# Patient Record
Sex: MALE | Employment: FULL TIME | URBAN - METROPOLITAN AREA
[De-identification: names, ages, dates, MRNs, and addresses within clinical notes are randomized per-mention and may not be internally consistent; named-entity substitution may affect disease eponyms.]

---

## 2019-01-04 ENCOUNTER — HOSPITAL ENCOUNTER (INPATIENT)
Facility: CLINIC | Age: 64
LOS: 7 days | Discharge: HOME HEALTH | DRG: 603 | End: 2019-01-11
Attending: EMERGENCY MEDICINE | Admitting: INTERNAL MEDICINE

## 2019-01-04 DIAGNOSIS — L02.419 ABSCESS OF ANKLE: ICD-10-CM

## 2019-01-04 DIAGNOSIS — L03.119 ANKLE CELLULITIS: ICD-10-CM

## 2019-01-04 DIAGNOSIS — L03.116 CELLULITIS AND ABSCESS OF LEFT LEG: Primary | ICD-10-CM

## 2019-01-04 DIAGNOSIS — L02.416 CELLULITIS AND ABSCESS OF LEFT LEG: Primary | ICD-10-CM

## 2019-01-04 DIAGNOSIS — A41.9 SEPSIS, DUE TO UNSPECIFIED ORGANISM: ICD-10-CM

## 2019-01-04 PROBLEM — E88.819 INSULIN RESISTANCE: Status: ACTIVE | Noted: 2019-01-04

## 2019-01-04 LAB
ANION GAP SERPL CALCULATED.3IONS-SCNC: 6 MMOL/L (ref 3–14)
BASOPHILS # BLD AUTO: 0 10E9/L (ref 0–0.2)
BASOPHILS NFR BLD AUTO: 0.3 %
BUN SERPL-MCNC: 16 MG/DL (ref 7–30)
CALCIUM SERPL-MCNC: 9.2 MG/DL (ref 8.5–10.1)
CHLORIDE SERPL-SCNC: 105 MMOL/L (ref 94–109)
CO2 SERPL-SCNC: 26 MMOL/L (ref 20–32)
CREAT SERPL-MCNC: 0.87 MG/DL (ref 0.66–1.25)
DIFFERENTIAL METHOD BLD: ABNORMAL
EOSINOPHIL # BLD AUTO: 0 10E9/L (ref 0–0.7)
EOSINOPHIL NFR BLD AUTO: 0.1 %
ERYTHROCYTE [DISTWIDTH] IN BLOOD BY AUTOMATED COUNT: 13 % (ref 10–15)
GFR SERPL CREATININE-BSD FRML MDRD: >90 ML/MIN/{1.73_M2}
GLUCOSE SERPL-MCNC: 102 MG/DL (ref 70–99)
HBA1C MFR BLD: 5.9 % (ref 0–5.6)
HCT VFR BLD AUTO: 41.8 % (ref 40–53)
HGB BLD-MCNC: 14.9 G/DL (ref 13.3–17.7)
IMM GRANULOCYTES # BLD: 0.1 10E9/L (ref 0–0.4)
IMM GRANULOCYTES NFR BLD: 0.7 %
LACTATE SERPL-SCNC: 1.7 MMOL/L (ref 0.4–2)
LYMPHOCYTES # BLD AUTO: 1.7 10E9/L (ref 0.8–5.3)
LYMPHOCYTES NFR BLD AUTO: 11.3 %
MCH RBC QN AUTO: 32 PG (ref 26.5–33)
MCHC RBC AUTO-ENTMCNC: 35.6 G/DL (ref 31.5–36.5)
MCV RBC AUTO: 90 FL (ref 78–100)
MONOCYTES # BLD AUTO: 1.3 10E9/L (ref 0–1.3)
MONOCYTES NFR BLD AUTO: 8.5 %
NEUTROPHILS # BLD AUTO: 11.9 10E9/L (ref 1.6–8.3)
NEUTROPHILS NFR BLD AUTO: 79.1 %
NRBC # BLD AUTO: 0 10*3/UL
NRBC BLD AUTO-RTO: 0 /100
PLATELET # BLD AUTO: 309 10E9/L (ref 150–450)
POTASSIUM SERPL-SCNC: 3.8 MMOL/L (ref 3.4–5.3)
RBC # BLD AUTO: 4.65 10E12/L (ref 4.4–5.9)
SODIUM SERPL-SCNC: 137 MMOL/L (ref 133–144)
WBC # BLD AUTO: 15 10E9/L (ref 4–11)

## 2019-01-04 PROCEDURE — 83605 ASSAY OF LACTIC ACID: CPT | Performed by: EMERGENCY MEDICINE

## 2019-01-04 PROCEDURE — 10060 I&D ABSCESS SIMPLE/SINGLE: CPT

## 2019-01-04 PROCEDURE — 25000128 H RX IP 250 OP 636: Performed by: EMERGENCY MEDICINE

## 2019-01-04 PROCEDURE — 36415 COLL VENOUS BLD VENIPUNCTURE: CPT | Performed by: EMERGENCY MEDICINE

## 2019-01-04 PROCEDURE — 87186 SC STD MICRODIL/AGAR DIL: CPT | Performed by: EMERGENCY MEDICINE

## 2019-01-04 PROCEDURE — 87077 CULTURE AEROBIC IDENTIFY: CPT | Performed by: EMERGENCY MEDICINE

## 2019-01-04 PROCEDURE — 96365 THER/PROPH/DIAG IV INF INIT: CPT

## 2019-01-04 PROCEDURE — 87040 BLOOD CULTURE FOR BACTERIA: CPT | Performed by: EMERGENCY MEDICINE

## 2019-01-04 PROCEDURE — 12000000 ZZH R&B MED SURG/OB

## 2019-01-04 PROCEDURE — 25000132 ZZH RX MED GY IP 250 OP 250 PS 637: Performed by: EMERGENCY MEDICINE

## 2019-01-04 PROCEDURE — 99223 1ST HOSP IP/OBS HIGH 75: CPT | Mod: AI | Performed by: INTERNAL MEDICINE

## 2019-01-04 PROCEDURE — 83036 HEMOGLOBIN GLYCOSYLATED A1C: CPT | Performed by: EMERGENCY MEDICINE

## 2019-01-04 PROCEDURE — 0H9NXZZ DRAINAGE OF LEFT FOOT SKIN, EXTERNAL APPROACH: ICD-10-PCS | Performed by: EMERGENCY MEDICINE

## 2019-01-04 PROCEDURE — 87800 DETECT AGNT MULT DNA DIREC: CPT | Performed by: EMERGENCY MEDICINE

## 2019-01-04 PROCEDURE — 96366 THER/PROPH/DIAG IV INF ADDON: CPT

## 2019-01-04 PROCEDURE — 25000128 H RX IP 250 OP 636: Performed by: INTERNAL MEDICINE

## 2019-01-04 PROCEDURE — 96367 TX/PROPH/DG ADDL SEQ IV INF: CPT

## 2019-01-04 PROCEDURE — 87070 CULTURE OTHR SPECIMN AEROBIC: CPT | Performed by: EMERGENCY MEDICINE

## 2019-01-04 PROCEDURE — 25000132 ZZH RX MED GY IP 250 OP 250 PS 637: Performed by: INTERNAL MEDICINE

## 2019-01-04 PROCEDURE — 99207 ZZC CDG-MDM COMPONENT: MEETS MODERATE - UP CODED: CPT | Performed by: INTERNAL MEDICINE

## 2019-01-04 PROCEDURE — 76882 US LMTD JT/FCL EVL NVASC XTR: CPT

## 2019-01-04 PROCEDURE — 80048 BASIC METABOLIC PNL TOTAL CA: CPT | Performed by: EMERGENCY MEDICINE

## 2019-01-04 PROCEDURE — 96361 HYDRATE IV INFUSION ADD-ON: CPT

## 2019-01-04 PROCEDURE — 99285 EMERGENCY DEPT VISIT HI MDM: CPT | Mod: 25

## 2019-01-04 PROCEDURE — 85025 COMPLETE CBC W/AUTO DIFF WBC: CPT | Performed by: EMERGENCY MEDICINE

## 2019-01-04 RX ORDER — PIPERACILLIN SODIUM, TAZOBACTAM SODIUM 3; .375 G/15ML; G/15ML
3.38 INJECTION, POWDER, LYOPHILIZED, FOR SOLUTION INTRAVENOUS EVERY 6 HOURS
Status: DISCONTINUED | OUTPATIENT
Start: 2019-01-05 | End: 2019-01-05

## 2019-01-04 RX ORDER — HYDROCODONE BITARTRATE AND ACETAMINOPHEN 5; 325 MG/1; MG/1
1-2 TABLET ORAL EVERY 4 HOURS PRN
Status: DISCONTINUED | OUTPATIENT
Start: 2019-01-04 | End: 2019-01-11 | Stop reason: HOSPADM

## 2019-01-04 RX ORDER — IBUPROFEN 200 MG
200 TABLET ORAL EVERY 6 HOURS PRN
COMMUNITY

## 2019-01-04 RX ORDER — IBUPROFEN 600 MG/1
600 TABLET, FILM COATED ORAL 3 TIMES DAILY PRN
Status: DISCONTINUED | OUTPATIENT
Start: 2019-01-04 | End: 2019-01-11 | Stop reason: HOSPADM

## 2019-01-04 RX ORDER — ACETAMINOPHEN 500 MG
1000 TABLET ORAL ONCE
Status: COMPLETED | OUTPATIENT
Start: 2019-01-04 | End: 2019-01-04

## 2019-01-04 RX ORDER — DEXTROSE MONOHYDRATE 25 G/50ML
25-50 INJECTION, SOLUTION INTRAVENOUS
Status: DISCONTINUED | OUTPATIENT
Start: 2019-01-04 | End: 2019-01-06

## 2019-01-04 RX ORDER — HYDROMORPHONE HYDROCHLORIDE 1 MG/ML
0.5 INJECTION, SOLUTION INTRAMUSCULAR; INTRAVENOUS; SUBCUTANEOUS
Status: DISCONTINUED | OUTPATIENT
Start: 2019-01-04 | End: 2019-01-04

## 2019-01-04 RX ORDER — DOCUSATE SODIUM 100 MG/1
100 CAPSULE, LIQUID FILLED ORAL 2 TIMES DAILY
Status: DISCONTINUED | OUTPATIENT
Start: 2019-01-04 | End: 2019-01-11 | Stop reason: HOSPADM

## 2019-01-04 RX ORDER — ONDANSETRON 2 MG/ML
4 INJECTION INTRAMUSCULAR; INTRAVENOUS EVERY 6 HOURS PRN
Status: DISCONTINUED | OUTPATIENT
Start: 2019-01-04 | End: 2019-01-11 | Stop reason: HOSPADM

## 2019-01-04 RX ORDER — ACETAMINOPHEN 325 MG/1
650 TABLET ORAL EVERY 4 HOURS PRN
Status: DISCONTINUED | OUTPATIENT
Start: 2019-01-04 | End: 2019-01-11 | Stop reason: HOSPADM

## 2019-01-04 RX ORDER — NICOTINE POLACRILEX 4 MG
15-30 LOZENGE BUCCAL
Status: DISCONTINUED | OUTPATIENT
Start: 2019-01-04 | End: 2019-01-06

## 2019-01-04 RX ORDER — SODIUM CHLORIDE 9 MG/ML
INJECTION, SOLUTION INTRAVENOUS CONTINUOUS
Status: DISCONTINUED | OUTPATIENT
Start: 2019-01-04 | End: 2019-01-06

## 2019-01-04 RX ORDER — ONDANSETRON 4 MG/1
4 TABLET, ORALLY DISINTEGRATING ORAL EVERY 6 HOURS PRN
Status: DISCONTINUED | OUTPATIENT
Start: 2019-01-04 | End: 2019-01-11 | Stop reason: HOSPADM

## 2019-01-04 RX ORDER — NALOXONE HYDROCHLORIDE 0.4 MG/ML
.1-.4 INJECTION, SOLUTION INTRAMUSCULAR; INTRAVENOUS; SUBCUTANEOUS
Status: DISCONTINUED | OUTPATIENT
Start: 2019-01-04 | End: 2019-01-11 | Stop reason: HOSPADM

## 2019-01-04 RX ORDER — PIPERACILLIN SODIUM, TAZOBACTAM SODIUM 3; .375 G/15ML; G/15ML
3.38 INJECTION, POWDER, LYOPHILIZED, FOR SOLUTION INTRAVENOUS ONCE
Status: COMPLETED | OUTPATIENT
Start: 2019-01-04 | End: 2019-01-04

## 2019-01-04 RX ADMIN — Medication 0.5 MG: at 17:43

## 2019-01-04 RX ADMIN — SODIUM CHLORIDE 1000 ML: 9 INJECTION, SOLUTION INTRAVENOUS at 17:45

## 2019-01-04 RX ADMIN — SODIUM CHLORIDE: 9 INJECTION, SOLUTION INTRAVENOUS at 22:31

## 2019-01-04 RX ADMIN — PIPERACILLIN SODIUM,TAZOBACTAM SODIUM 3.38 G: 3; .375 INJECTION, POWDER, FOR SOLUTION INTRAVENOUS at 18:29

## 2019-01-04 RX ADMIN — SODIUM CHLORIDE 1500 MG: 9 INJECTION, SOLUTION INTRAVENOUS at 20:12

## 2019-01-04 RX ADMIN — HYDROCODONE BITARTRATE AND ACETAMINOPHEN 1 TABLET: 5; 325 TABLET ORAL at 22:30

## 2019-01-04 RX ADMIN — ACETAMINOPHEN 1000 MG: 500 TABLET, FILM COATED ORAL at 17:43

## 2019-01-04 SDOH — HEALTH STABILITY: MENTAL HEALTH: HOW OFTEN DO YOU HAVE A DRINK CONTAINING ALCOHOL?: NEVER

## 2019-01-04 ASSESSMENT — ACTIVITIES OF DAILY LIVING (ADL)
FALL_HISTORY_WITHIN_LAST_SIX_MONTHS: NO
TOILETING: 0-->INDEPENDENT
DRESS: 0-->INDEPENDENT
SWALLOWING: 0-->SWALLOWS FOODS/LIQUIDS WITHOUT DIFFICULTY
AMBULATION: 0-->INDEPENDENT
TRANSFERRING: 0-->INDEPENDENT
RETIRED_COMMUNICATION: 0-->UNDERSTANDS/COMMUNICATES WITHOUT DIFFICULTY
COGNITION: 0 - NO COGNITION ISSUES REPORTED
RETIRED_EATING: 0-->INDEPENDENT
BATHING: 0-->INDEPENDENT

## 2019-01-04 ASSESSMENT — MIFFLIN-ST. JEOR: SCORE: 1502.99

## 2019-01-04 ASSESSMENT — ENCOUNTER SYMPTOMS
CHILLS: 1
FEVER: 0

## 2019-01-04 NOTE — ED PROVIDER NOTES
"  History     Chief Complaint:  Left Ankle Redness, Pain, Swelling    HPI   Swapnil Newberry is an otherwise healthy 63 year old Tajik-speaking male who presents for evaluation of pain, redness, and swelling to his medial left ankle. The patient reports he first noticed a small rash to the area on Saturday, 6 days ago. Since that time, he has had spreading redness, increased swelling, and pain to his medial ankle. He has had difficulty walking today secondary to pain. He is taking Ibuprofen at home without significant relief, last dose this morning. The patient reports chills and feeling feverish at home but denies any measured fever, nausea, vomiting, or other systemic symptoms. He denies numbness or weakness in his toes. He denies any trauma or injury. He denies being diabetic and not immunocompromised. He is not anticoagulated.     Allergies:  No Known Allergies     Medications:    The patient is not currently taking any prescribed medications.    Past Medical History:    History reviewed. No pertinent past medical history.    Past Surgical History:    Hernia repair    Family History:    History reviewed. No pertinent family history.     Social History:  Smoking status: Former smoker  Alcohol use: Occasional   The patient works in construction.  Presents to the ED with his wife and goddaughter.     Review of Systems   Constitutional: Positive for chills. Negative for fever.   Skin: Positive for rash.   All other systems reviewed and are negative.      Physical Exam     Patient Vitals for the past 24 hrs:   BP Temp Temp src Heart Rate Resp SpO2 Height Weight   01/04/19 1531 129/77 99.9  F (37.7  C) Oral 120 20 100 % 1.651 m (5' 5\") 77.1 kg (170 lb)       Physical Exam  General: male semi-recumbent in room 30, family at bedside  HENT: MMM, no signs of facial trauma  CV: tachycardic, regular rhythm, soft compartments throughout LLE, cap refill normal, normal L DP and PT pulses  Resp: normal effort, " clear throughout  GI: abdomen soft, nontender  MSK:  Extremities: significant tenderness and moderate swelling to L medial ankle overlying medial malleolus with slight fluctuance, no swelling to remainder of LLE  No pain with axial loading of L ankle or evidence of ankle effusion  Can dorsiflex and plantar flex L ankle, and has full movement of all L toes  L knee normal  Skin:   Erythema primarily overlying L medial ankle where swollen, but also extending slightly up postero-medial L ankle/lower leg but no streaking  Neuro: awake, alert, GCS 15, responds appropriately to commands, SILT all extremities  Psych: cooperative, pleasant      Emergency Department Course   Laboratory:  CBC: WBC 15.0(H), o/w WNL (HGB 14.9, )  BMP: Glucose 102(H), o/w WNL (Creatinine 0.87)  Lactic acid: 1.7  Blood cultures x2: in process  Wound culture: in process      Procedure: Incision and Drainage   Performed by: Travis Buckner MD    LOCATION:  L medial ankle      ANESTHESIA:  Local field block using Marcaine 0.5% without epinephrine, total of 3 mLs    PREPARATION:  Cleansed with Betadine    PROCEDURE:  Area was incised with # 11 Blade ([place]) with a cruciate incision.  Wound treatment included Deloculation, Purulent Drainage and Expression of Material.  Packing consisted of No Packing.  Appropriate dressing was applied to cover the area.    Patient Status:  Patient tolerated the procedure moderately well. There were no complications evident      ED Bedside Limited Ultrasound  Body area scanned: L medial ankle  Performed by: Travis Buckner MD  Indication: pain/redness/swelling, eval for abscess  Findings/Interpretation: fluid collection identified primarily overlying L medial malleolus, no deeper extension evident on US, no nearby pulsatile vessels  Consistent with soft tissue abscess   Key images were digitally archived in the [employer] [facility] radiology system.    Interventions:  1743: Dilaudid 0.5 mg IV  1743: Tylenol 1,000 mg  "oral  1745: NS 1L IV Bolus  Zosyn 3.375 g IV ordered  Vancomycin 1,500 mg IV ordered    Emergency Department Course:  Past medical records, nursing notes, and vitals reviewed.  1711: I performed an exam of the patient and obtained history, as documented above.    1718: POC Bedside US performed per the above procedure note. US reveals abscess which was drained per the above procedure note.     IV inserted and blood drawn.    I performed electronic chart review in EPIC.  I reviewed available electronic records in Care Everywhere.    1817: I spoke to Dr. Pizano of the hospitalist service who accepts the patient for admission.     Findings and plan explained to the Patient and family who consents to admission.   Discussed the patient with Dr. Pizano, who will admit the patient to a inpatient med bed for further monitoring, evaluation, and treatment.     Impression & Plan      Medical Decision Making:  Patient presents with elevated temperature, tachycardia, and obvious evidence of a soft tissue infection on exam. Ultrasound confirmed a fairly superficial abscess which I drained as documented above. I considered involvement of the actual ankle joint itself, though he does not have palpable ankle effusion or pain with axial loading and I do not think this represents a septic joint nor that orthopedics needs to be involved emergently. However, given his systemic symptoms, I think initiation of broad spectrum antibiotics and hospitalization for close monitoring is indicated.  I do not suspect a necrotizing infection.  He denies being diabetic though [facility] records show \"insulin resistance\" that has so far not required formal treatment.  He and his family agree with this plan and he will be admitted for further care.     Diagnosis:    ICD-10-CM   1. Abscess of ankle L02.419   2. Ankle cellulitis L03.119   3. Sepsis, due to unspecified organism (H) A41.9       Disposition: Admitted under the care of Dr. Jerica Stone " Roberta  1/4/2019    EMERGENCY DEPARTMENT    I, Bertha Roberta, am serving as a scribe at 5:11 PM on 1/4/2019 to document services personally performed by Travis Buckner MD based on my observations and the provider's statements to me.     This record was created at least in part using electronic voice recognition software, so please excuse any typographical errors.         Travis Buckner MD  01/04/19 9347

## 2019-01-05 LAB
ERYTHROCYTE [DISTWIDTH] IN BLOOD BY AUTOMATED COUNT: 13.4 % (ref 10–15)
GLUCOSE BLDC GLUCOMTR-MCNC: 108 MG/DL (ref 70–99)
GLUCOSE BLDC GLUCOMTR-MCNC: 131 MG/DL (ref 70–99)
GLUCOSE BLDC GLUCOMTR-MCNC: 134 MG/DL (ref 70–99)
GLUCOSE BLDC GLUCOMTR-MCNC: 91 MG/DL (ref 70–99)
HCT VFR BLD AUTO: 39.5 % (ref 40–53)
HGB BLD-MCNC: 13.6 G/DL (ref 13.3–17.7)
MCH RBC QN AUTO: 31.1 PG (ref 26.5–33)
MCHC RBC AUTO-ENTMCNC: 34.4 G/DL (ref 31.5–36.5)
MCV RBC AUTO: 90 FL (ref 78–100)
PLATELET # BLD AUTO: 310 10E9/L (ref 150–450)
RBC # BLD AUTO: 4.37 10E12/L (ref 4.4–5.9)
WBC # BLD AUTO: 14.8 10E9/L (ref 4–11)

## 2019-01-05 PROCEDURE — 00000146 ZZHCL STATISTIC GLUCOSE BY METER IP

## 2019-01-05 PROCEDURE — 99232 SBSQ HOSP IP/OBS MODERATE 35: CPT | Performed by: INTERNAL MEDICINE

## 2019-01-05 PROCEDURE — 25000128 H RX IP 250 OP 636: Performed by: INTERNAL MEDICINE

## 2019-01-05 PROCEDURE — 36415 COLL VENOUS BLD VENIPUNCTURE: CPT | Performed by: INTERNAL MEDICINE

## 2019-01-05 PROCEDURE — 12000000 ZZH R&B MED SURG/OB

## 2019-01-05 PROCEDURE — 25000132 ZZH RX MED GY IP 250 OP 250 PS 637: Performed by: INTERNAL MEDICINE

## 2019-01-05 PROCEDURE — 85027 COMPLETE CBC AUTOMATED: CPT | Performed by: INTERNAL MEDICINE

## 2019-01-05 RX ADMIN — HYDROCODONE BITARTRATE AND ACETAMINOPHEN 2 TABLET: 5; 325 TABLET ORAL at 06:47

## 2019-01-05 RX ADMIN — PIPERACILLIN SODIUM,TAZOBACTAM SODIUM 3.38 G: 3; .375 INJECTION, POWDER, FOR SOLUTION INTRAVENOUS at 01:13

## 2019-01-05 RX ADMIN — HYDROCODONE BITARTRATE AND ACETAMINOPHEN 2 TABLET: 5; 325 TABLET ORAL at 16:49

## 2019-01-05 RX ADMIN — DOCUSATE SODIUM 100 MG: 100 CAPSULE, LIQUID FILLED ORAL at 09:12

## 2019-01-05 RX ADMIN — PIPERACILLIN SODIUM,TAZOBACTAM SODIUM 3.38 G: 3; .375 INJECTION, POWDER, FOR SOLUTION INTRAVENOUS at 07:42

## 2019-01-05 RX ADMIN — HYDROCODONE BITARTRATE AND ACETAMINOPHEN 2 TABLET: 5; 325 TABLET ORAL at 10:42

## 2019-01-05 RX ADMIN — SODIUM CHLORIDE: 9 INJECTION, SOLUTION INTRAVENOUS at 13:39

## 2019-01-05 RX ADMIN — HYDROCODONE BITARTRATE AND ACETAMINOPHEN 2 TABLET: 5; 325 TABLET ORAL at 22:44

## 2019-01-05 RX ADMIN — SODIUM CHLORIDE 1500 MG: 9 INJECTION, SOLUTION INTRAVENOUS at 15:01

## 2019-01-05 ASSESSMENT — ACTIVITIES OF DAILY LIVING (ADL)
ADLS_ACUITY_SCORE: 12

## 2019-01-05 NOTE — PROVIDER NOTIFICATION
"MD Notification    Notified Person: MD    Notified Person Name: Turner    Notification Date/Time: 01/05/19  9:18 AM    Notification Interaction: online paging    Purpose of Notification: \"Pt had positive blood cultures from 1/4 draw growing gram positive cocci clusters\"    Orders Received:     Comments:  "

## 2019-01-05 NOTE — PLAN OF CARE
Pt arrived from ED at 2045. Pt alert and oriented. Sami speaker. C/O tingling on LLE. Pt vital signs stable. Afebrile. LLE with  swelling and redness. Kerlix around the wound. Dressing with dried drainage. IVF infusing. Pt on antibiotics. LLE elevated. Voiding adequate amounts per urinal. Wife at bedside. Will continue to monitor

## 2019-01-05 NOTE — PROGRESS NOTES
"[facility]    Hospitalist Progress Note    Date of Service (when I saw the patient): 01/05/2019    Assessment & Plan   64 y/o male with a h/o \"insulin resistance\" presented to the ED on 1/4 with LLE erythema and pain. Admitted for treatment of cellulitis    Cellulitis  Abscess (drained in ED)  As above, presented with L ankle and foot pain. Ultrasound in ED with superficial abscess which was drained. Tmax since admission at 99.9. WBC initially 15.0-> WBC to 14.8  - blood cultures x 2 positive for GPC in clusters, MRSA  - on zosyn on admission, dose of vanco given-> with MRSA discontinued zosyn and continue on IV vancomycin  - given MRSA in blood, will consult ID on 1/6 for guidance with duration of abx  - monitor, if slow to improve will consult podiatry  - remains afebrile  - trend WBC  - prn norco, prn acetaminophen    \"insulin resistance\"  Noted on outpatient records. A1C at 5.9  - on sliding scale insulin, no insulin needed  - likely discontinue on 1/6 if remains under control.    # Pain Assessment:  Current Pain Score 1/5/2019   Patient currently in pain? denies   Pain score (0-10) -   Swapnil del valle pain level was assessed, 2/2 celluitis. As above    FEN (fluids, electrolytes and nutrition): regular diet  Discussed with nursing.  DVT Prophylaxis: Enoxaparin (Lovenox) SQ  Code Status: Full Code    Disposition: Expected discharge in 2+ days pending response to abx, ID plans    Natanael Tompkins MD  [phone] (P)  Text Page    Interval History   Doing ok. Not able to get  (left prior to my visit, unable to get video ). Denies cp/sob. Eating ok.     -Data reviewed today: I reviewed all new labs and imaging results over the last 24 hours. I personally reviewed no images or EKG's today.    Physical Exam   Temp: 98.3  F (36.8  C) Temp src: Oral BP: 117/66 Pulse: 90 Heart Rate: 90 Resp: 16 SpO2: 95 % O2 Device: None (Room air)    Vitals:    01/04/19 1531   Weight: 77.1 kg (170 lb) "     Vital Signs with Ranges  Temp:  [98.3  F (36.8  C)-99  F (37.2  C)] 98.3  F (36.8  C)  Pulse:  [] 90  Heart Rate:  [90-98] 90  Resp:  [16-20] 16  BP: (116-147)/(66-85) 117/66  SpO2:  [95 %-97 %] 95 %  I/O last 3 completed shifts:  In: 1568.75 [P.O.:440; I.V.:1128.75]  Out: 1200 [Urine:1200]    Constitutional: Alert, oriented, no acute distress  Respiratory: Lungs CTA B  Cardiovascular: RRR. No murmurs  GI: Soft, non-tender, non-disteneded, good bowel sounds  Skin/Integumen: No erythema, cyanosis or edema. Cellulitis involving his L foot and ankle  Other:      Medications     sodium chloride 75 mL/hr at 01/05/19 1339       docusate sodium  100 mg Oral BID     influenza vaccine adult (product based on age)  0.5 mL Intramuscular Prior to discharge     insulin aspart  1-10 Units Subcutaneous TID w/meals     vancomycin (VANCOCIN) IV  1,500 mg Intravenous Q12H       Data   Recent Labs   Lab 01/05/19  0733 01/04/19  1730   WBC 14.8* 15.0*   HGB 13.6 14.9   MCV 90 90    309   NA  --  137   POTASSIUM  --  3.8   CHLORIDE  --  105   CO2  --  26   BUN  --  16   CR  --  0.87   ANIONGAP  --  6   OLEGARIO  --  9.2   GLC  --  102*       Recent Results (from the past 24 hour(s))   POC US SOFT TISSUE    Impression    ED Bedside Limited Ultrasound  Body area scanned: L medial ankle  Performed by: Travis Buckner MD  Indication: pain/redness/swelling, eval for abscess  Findings/Interpretation: fluid collection identified primarily overlying L medial malleolus, no deeper extension evident on US, no nearby pulsatile vessels  Consistent with soft tissue abscess   Key images were digitally archived in the [employer] [facility] [employer] radiology system.

## 2019-01-05 NOTE — H&P
[facility]    History and Physical  Hospitalist       Date of Admission:  1/4/2019    Assessment & Plan   Swapnil Newberry is a 63 year old male who presents with painful swollen left lower leg:    Summary:    Principal Problem:    Cellulitis and abscess of left ankle  Active Problems:    Insulin resistance -- 2012       Plan: Started on IV Zosyn, will add one dose of IV Vancomycin for possible MRSA and await culture for I&D that was done in the ER.  Will check blood sugars and Hgb A1C.      DVT Prophylaxis: Pneumatic Compression Devices  Code Status: Full Code    Disposition: Expected discharge in 2-3 days once Cellulitis better.    Zafar Jones MD  Pager: [phone]  Cell Phone:  [phone]     Primary Care Physician   Physician No Ref-Primary    Chief Complaint   Red swollen left lower leg    History is obtained from Patient    History of Present Illness   63 year old  male who speaks Italian, with hx of insulin resistance (not on any diabetic medications), Vit D deficiency -- who presents with small bump on left lower leg/ankle area about 6 days ago, no known injury, but has gotten slowly worse since then with increased redness, swelling and pain -- currently rates it 2 out of 10 (better since the I and D completely).  No prior skin infections.  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  He has felt warm but did not check his temperature.  Current glucose is 102 and WBC is 15.0.      Past Medical History    I have reviewed this patient's medical history and updated it with pertinent information if needed.   Past Medical History:   Diagnosis Date     H. pylori infection 2013     Inguinal hernia 2016    Bilateral inguinal hernia repair laprascopically     Insulin resistance 2012     Vitamin D deficiency 2012       Past Surgical History   I have reviewed this patient's surgical history and updated it with pertinent information if  needed.  Past Surgical History:   Procedure Laterality Date     HERNIA REPAIR Bilateral 2016    Laprascopic repair       Prior to Admission Medications   Prior to Admission Medications   Prescriptions Last Dose Informant Patient Reported? Taking?   ibuprofen (ADVIL/MOTRIN) 200 MG tablet Past Week at PRN  Yes Yes   Sig: Take 200 mg by mouth every 6 hours as needed for mild pain      Facility-Administered Medications: None     Allergies   No Known Allergies    Social History   I have reviewed this patient's social history and updated it with pertinent information if needed. Swapnil Newberry  reports that he quit smoking about 3 years ago. His smoking use included cigarettes. He has a 20.00 pack-year smoking history. He does not have any smokeless tobacco history on file. He reports that he drinks alcohol. He reports that he does not use drugs.    Family History   I have reviewed this patient's family history and updated it with pertinent information if needed.   Family History   Problem Relation Age of Onset     Diabetes Mother      Cerebrovascular Disease Father        Review of Systems   The 10 point Review of Systems is negative other than noted in the HPI or here.     # Pain Assessment:  Current Pain Score 1/4/2019   Pain score (0-10) 10   - Swapnil is experiencing pain due to ankle infection. Pain management was discussed and the plan was created in a collaborative fashion.  Swapnil's response to the current recommendations: engaged  - see orders    Physical Exam   Temp: 99.9  F (37.7  C) Temp src: Oral BP: 129/77   Heart Rate: 120 Resp: 20 SpO2: 100 %      Vital Signs with Ranges  Temp:  [99.9  F (37.7  C)] 99.9  F (37.7  C)  Heart Rate:  [120] 120  Resp:  [20] 20  BP: (129)/(77) 129/77  SpO2:  [100 %] 100 %  170 lbs 0 oz    Constitutional: Awake, alert, cooperative, no apparent distress.  Eyes: Conjunctiva and pupils examined and normal.  HEENT: Moist mucous membranes, normal dentition.  Respiratory:  Clear to auscultation bilaterally, no crackles or wheezing.  Cardiovascular: Regular rate and rhythm, normal S1 and S2, and no murmur noted,   No carotid bruits.  No ankle edema on right, but on left has 2+ankle edema with redness and tenderness, and fluctuance over the medial malleolus, but no pain with ankle flexion/extension, and redness extends to mid calf.   GI: Soft, non-distended, non-tender, normal bowel sounds.  Lymph/Hematologic: No anterior cervical, supraclavicular or axillary adenopathy.  Skin: No rashes, no cyanosis.   Musculoskeletal: No joint swelling, erythema or tenderness.  Neurologic: Cranial nerves 2-12 intact, no focal weakness or numbness  Psychiatric: Alert, Ox3, no obvious anxiety or depression.    Data   Data reviewed today:  I personally reviewed no images or EKG's today.  Recent Labs   Lab 01/04/19  1730   WBC 15.0*   HGB 14.9   MCV 90         POTASSIUM 3.8   CHLORIDE 105   CO2 26   BUN 16   CR 0.87   ANIONGAP 6   OLEGARIO 9.2   *       Imaging:  Recent Results (from the past 24 hour(s))   POC US SOFT TISSUE    Impression    ED Bedside Limited Ultrasound  Body area scanned: L medial ankle  Performed by: Travis Buckner MD  Indication: pain/redness/swelling, eval for abscess  Findings/Interpretation: fluid collection identified primarily overlying L medial malleolus, no deeper extension evident on US, no nearby pulsatile vessels  Consistent with soft tissue abscess   Key images were digitally archived in the [employer] radiology system.

## 2019-01-05 NOTE — PROVIDER NOTIFICATION
"MD Notification    Notified Person: MD    Notified Person Name: gale    Notification Date/Time: 01/05/19 12:14 PM    Notification Interaction: online paging    Purpose of Notification:  \"Blood cultures from L arm positive for Staphylococcus Aureus and MSRA\"    Orders Received: Antibiotic changed to Vanco    Comments:  "

## 2019-01-05 NOTE — PHARMACY-VANCOMYCIN DOSING SERVICE
Pharmacy Vancomycin Initial Note  Date of Service 2019  Patient's  1955  63 year old, male    Indication: Skin and Soft Tissue Infection- MRSA positive    Current estimated CrCl = Estimated Creatinine Clearance: 85.4 mL/min (based on SCr of 0.87 mg/dL).    Creatinine for last 3 days  2019:  5:30 PM Creatinine 0.87 mg/dL    Recent Vancomycin Level(s) for last 3 days  No results found for requested labs within last 72 hours.      Vancomycin IV Administrations (past 72 hours)                   vancomycin (VANCOCIN) 1,500 mg in sodium chloride 0.9 % 250 mL intermittent infusion (mg) 1,500 mg New Bag 19                Nephrotoxins and other renal medications (From now, onward)    Start     Dose/Rate Route Frequency Ordered Stop    19 1600  vancomycin (VANCOCIN) 1,500 mg in sodium chloride 0.9 % 250 mL intermittent infusion      1,500 mg  over 90 Minutes Intravenous EVERY 12 HOURS 19 1307      19  ibuprofen (ADVIL/MOTRIN) tablet 600 mg      600 mg Oral 3 TIMES DAILY PRN 19 204            Contrast Orders - past 72 hours (72h ago, onward)    None                Plan:  1.  Start vancomycin  1500 mg IV q12h.   2.  Goal Trough Level: 15-20 mg/L   3.  Pharmacy will check trough levels as appropriate in 1-3 Days.    4. Serum creatinine levels will be ordered daily for the first week of therapy and at least twice weekly for subsequent weeks.    5. Eloy method utilized to dose vancomycin therapy: Method 1    Carter Lyons

## 2019-01-05 NOTE — PROVIDER NOTIFICATION
"MD Notification    Notified Person: MD    Notified Person Name: Turner    Notification Date/Time: 01/05/19 11:46 AM    Notification Interaction: online paging    Purpose of Notification:     \"Blood culture on R arm positive for gram positive cocci in clusters, do you want any additional antibiotics?\"    Orders Received:    Comments:  "

## 2019-01-05 NOTE — ED NOTES
"[facility]  ED Nurse Handoff Report    ED Chief complaint: Wound Check (6 days ago pt had small rash on medical left ankle. Since yesterday area has swollen, is red and painful. Whole foot is swollen. c/o chills)      ED Diagnosis:   Final diagnoses:   Abscess of ankle   Ankle cellulitis   Sepsis, due to unspecified organism (H)       Code Status: Full Code    Allergies: No Known Allergies    Activity level - Baseline/Home:  Independent    Activity Level - Current:   Independent     Needed?: Yes    Isolation: No  Infection: Not Applicable  Bariatric?: No    Vital Signs:   Vitals:    01/04/19 1531   BP: 129/77   Resp: 20   Temp: 99.9  F (37.7  C)   TempSrc: Oral   SpO2: 100%   Weight: 77.1 kg (170 lb)   Height: 1.651 m (5' 5\")       Cardiac Rhythm: ,        Pain level: 0-10 Pain Scale: 10    Is this patient confused?: No   Does this patient have a guardian?  No         If yes, is there guardianship documents in the Epic \"Code/ACP\" activity?  N/A         Guardian Notified?  N/A  [place] - Suicide Severity Rating Scale Completed?  Yes  If yes, what color did the patient score?  White    Patient Report: Initial Complaint: Left ankle pain for 8 days with redness and edema, fever at home.  Focused Assessment: A/O, tachycardic, left ankle red and edema with pain.  Tests Performed: labs  Abnormal Results:   Results for orders placed or performed during the hospital encounter of 01/04/19   POC US SOFT TISSUE    Impression    ED Bedside Limited Ultrasound  Body area scanned: L medial ankle  Performed by: Travis Buckner MD  Indication: pain/redness/swelling, eval for abscess  Findings/Interpretation: fluid collection identified primarily overlying L medial malleolus, no deeper extension evident on US, no nearby pulsatile vessels  Consistent with soft tissue abscess   Key images were digitally archived in the [facility] radiology system.       CBC with platelets differential   Result Value Ref Range "    WBC 15.0 (H) 4.0 - 11.0 10e9/L    RBC Count 4.65 4.4 - 5.9 10e12/L    Hemoglobin 14.9 13.3 - 17.7 g/dL    Hematocrit 41.8 40.0 - 53.0 %    MCV 90 78 - 100 fl    MCH 32.0 26.5 - 33.0 pg    MCHC 35.6 31.5 - 36.5 g/dL    RDW 13.0 10.0 - 15.0 %    Platelet Count 309 150 - 450 10e9/L    Diff Method Automated Method     % Neutrophils 79.1 %    % Lymphocytes 11.3 %    % Monocytes 8.5 %    % Eosinophils 0.1 %    % Basophils 0.3 %    % Immature Granulocytes 0.7 %    Nucleated RBCs 0 0 /100    Absolute Neutrophil 11.9 (H) 1.6 - 8.3 10e9/L    Absolute Lymphocytes 1.7 0.8 - 5.3 10e9/L    Absolute Monocytes 1.3 0.0 - 1.3 10e9/L    Absolute Eosinophils 0.0 0.0 - 0.7 10e9/L    Absolute Basophils 0.0 0.0 - 0.2 10e9/L    Abs Immature Granulocytes 0.1 0 - 0.4 10e9/L    Absolute Nucleated RBC 0.0    Basic metabolic panel   Result Value Ref Range    Sodium 137 133 - 144 mmol/L    Potassium 3.8 3.4 - 5.3 mmol/L    Chloride 105 94 - 109 mmol/L    Carbon Dioxide 26 20 - 32 mmol/L    Anion Gap 6 3 - 14 mmol/L    Glucose 102 (H) 70 - 99 mg/dL    Urea Nitrogen 16 7 - 30 mg/dL    Creatinine 0.87 0.66 - 1.25 mg/dL    GFR Estimate >90 >60 mL/min/[1.73_m2]    GFR Estimate If Black >90 >60 mL/min/[1.73_m2]    Calcium 9.2 8.5 - 10.1 mg/dL   Lactic acid   Result Value Ref Range    Lactic Acid 1.7 0.4 - 2.0 mmol/L     Treatments provided: Ns, IV abx, pain meds, drainage of wound    Family Comments: Family here and present, requests  upstairs    OBS brochure/video discussed/provided to patient/family: N/A              Name of person given brochure if not patient: NA              Relationship to patient: NA    ED Medications:   Medications   piperacillin-tazobactam (ZOSYN) 3.375 g vial to attach to  mL bag (3.375 g Intravenous New Bag 1/4/19 6849)   HYDROmorphone (PF) (DILAUDID) injection 0.5 mg (0.5 mg Intravenous Given 1/4/19 6883)   vancomycin (VANCOCIN) 1,500 mg in sodium chloride 0.9 % 250 mL intermittent infusion (not  administered)   0.9% sodium chloride BOLUS (1,000 mLs Intravenous New Bag 1/4/19 1745)   acetaminophen (TYLENOL) tablet 1,000 mg (1,000 mg Oral Given 1/4/19 1743)       Drips infusing?:  No    For the majority of the shift this patient was Green.   Interventions performed were Plan of care.    Severe Sepsis OR Septic Shock Diagnosis Present: No    To be done/followed up on inpatient unit:  D    ED NURSE PHONE NUMBER: [phone]

## 2019-01-05 NOTE — PROGRESS NOTES
RECEIVING UNIT ED HANDOFF REVIEW    ED Nurse Handoff Report was reviewed by: Emelia Posadas on January 4, 2019 at 7:18 PM

## 2019-01-05 NOTE — PHARMACY-ADMISSION MEDICATION HISTORY
Admission medication history interview status for the 1/4/2019  admission is complete. See EPIC admission navigator for prior to admission medications     Medication history source reliability:Good    Actions taken by pharmacist (provider contacted, etc):Spoke with patient (Mongolian speaker) and his family. Not taking any other medications.     Additional medication history information not noted on PTA med list :None    Medication reconciliation/reorder completed by provider prior to medication history? No    Time spent in this activity: 10 min    Prior to Admission medications    Medication Sig Last Dose Taking? Auth Provider   ibuprofen (ADVIL/MOTRIN) 200 MG tablet Take 200 mg by mouth every 6 hours as needed for mild pain Past Week at PRN Yes Unknown, Entered By History     Fabi Dawson, PharmD IV Student

## 2019-01-06 LAB
ANION GAP SERPL CALCULATED.3IONS-SCNC: 6 MMOL/L (ref 3–14)
BACTERIA SPEC CULT: ABNORMAL
BUN SERPL-MCNC: 12 MG/DL (ref 7–30)
CALCIUM SERPL-MCNC: 8.3 MG/DL (ref 8.5–10.1)
CHLORIDE SERPL-SCNC: 106 MMOL/L (ref 94–109)
CO2 SERPL-SCNC: 25 MMOL/L (ref 20–32)
CREAT SERPL-MCNC: 0.76 MG/DL (ref 0.66–1.25)
ERYTHROCYTE [DISTWIDTH] IN BLOOD BY AUTOMATED COUNT: 13.2 % (ref 10–15)
GFR SERPL CREATININE-BSD FRML MDRD: >90 ML/MIN/{1.73_M2}
GLUCOSE BLDC GLUCOMTR-MCNC: 105 MG/DL (ref 70–99)
GLUCOSE BLDC GLUCOMTR-MCNC: 86 MG/DL (ref 70–99)
GLUCOSE SERPL-MCNC: 98 MG/DL (ref 70–99)
HCT VFR BLD AUTO: 38.1 % (ref 40–53)
HGB BLD-MCNC: 13.5 G/DL (ref 13.3–17.7)
Lab: ABNORMAL
MCH RBC QN AUTO: 32.1 PG (ref 26.5–33)
MCHC RBC AUTO-ENTMCNC: 35.4 G/DL (ref 31.5–36.5)
MCV RBC AUTO: 91 FL (ref 78–100)
PLATELET # BLD AUTO: 286 10E9/L (ref 150–450)
POTASSIUM SERPL-SCNC: 4 MMOL/L (ref 3.4–5.3)
RBC # BLD AUTO: 4.21 10E12/L (ref 4.4–5.9)
SODIUM SERPL-SCNC: 137 MMOL/L (ref 133–144)
SPECIMEN SOURCE: ABNORMAL
WBC # BLD AUTO: 11.3 10E9/L (ref 4–11)

## 2019-01-06 PROCEDURE — 12000000 ZZH R&B MED SURG/OB

## 2019-01-06 PROCEDURE — 25000132 ZZH RX MED GY IP 250 OP 250 PS 637: Performed by: INTERNAL MEDICINE

## 2019-01-06 PROCEDURE — 25000128 H RX IP 250 OP 636: Performed by: INTERNAL MEDICINE

## 2019-01-06 PROCEDURE — 80048 BASIC METABOLIC PNL TOTAL CA: CPT | Performed by: INTERNAL MEDICINE

## 2019-01-06 PROCEDURE — 87040 BLOOD CULTURE FOR BACTERIA: CPT | Performed by: INTERNAL MEDICINE

## 2019-01-06 PROCEDURE — 36415 COLL VENOUS BLD VENIPUNCTURE: CPT | Performed by: INTERNAL MEDICINE

## 2019-01-06 PROCEDURE — 99232 SBSQ HOSP IP/OBS MODERATE 35: CPT | Performed by: INTERNAL MEDICINE

## 2019-01-06 PROCEDURE — 85027 COMPLETE CBC AUTOMATED: CPT | Performed by: INTERNAL MEDICINE

## 2019-01-06 PROCEDURE — 00000146 ZZHCL STATISTIC GLUCOSE BY METER IP

## 2019-01-06 RX ADMIN — SODIUM CHLORIDE: 9 INJECTION, SOLUTION INTRAVENOUS at 04:32

## 2019-01-06 RX ADMIN — SODIUM CHLORIDE 1500 MG: 9 INJECTION, SOLUTION INTRAVENOUS at 04:26

## 2019-01-06 RX ADMIN — IBUPROFEN 600 MG: 600 TABLET, FILM COATED ORAL at 16:33

## 2019-01-06 RX ADMIN — HYDROCODONE BITARTRATE AND ACETAMINOPHEN 1 TABLET: 5; 325 TABLET ORAL at 14:23

## 2019-01-06 RX ADMIN — DOCUSATE SODIUM 100 MG: 100 CAPSULE, LIQUID FILLED ORAL at 20:24

## 2019-01-06 RX ADMIN — SODIUM CHLORIDE 1500 MG: 9 INJECTION, SOLUTION INTRAVENOUS at 16:06

## 2019-01-06 RX ADMIN — HYDROCODONE BITARTRATE AND ACETAMINOPHEN 2 TABLET: 5; 325 TABLET ORAL at 06:06

## 2019-01-06 ASSESSMENT — ACTIVITIES OF DAILY LIVING (ADL)
ADLS_ACUITY_SCORE: 12

## 2019-01-06 NOTE — CONSULTS
Consult Date:  01/06/2019      REFERRING PHYSICIAN:  Dr. Natanael Tompkins.      IMPRESSION:   1.  A 63-year-old male with acute spontaneous left ankle medial abscess, on drainage it looks like community-acquired MRSA, not clear if it has been completely drained, no evidence of deeper involvement or ankle involvement.   2.  No major sepsis, but admission blood cultures 2/2 rapidly positive for the same MRSA organism, likely spillover from the leg, but of course as always, Staph aureus bacteremia major issue, no signs of secondary involved sites or active sepsis.      RECOMMENDATIONS:   1.  Vancomycin for now.   2.  Serial blood cultures until clear, 2 today and then 1 daily going forward.   3.  Will need extended IV antibiotics.  Social Service to see, hold on any long line until cultures are back.   4.  Continue to follow the foot.  Not clear to me that it has been adequately drained by the bedside I and D in the ER; however, as it turns out here is going to need extended IV antibiotics, so probably okay for now.      HISTORY OF PRESENT ILLNESS:  This 63-year-old male is seen in consultation due to MRSA bacteremia.  The patient is a [place] immigrant, does not speak English, but fortunately an  family is available.  The patient was in his usual state of health until about 5 days prior to admission when he began noticing spontaneous drainage from his left medial ankle.  There are notes talking about some injury, but when carefully questioned, there was really no injury, cuts, scratch, or other event that occurred.  This was a spontaneous event.  Of note, he was not feeling ill in any fashion at that time and the ankle joint has never felt like it was involved.  He tried to treat it with his own topical agents and homeopathic interventions without benefit and then sought medical attention.  At the time of presentation, he had some low-grade fever, but did not appear septic, had an obvious abscess, and it was  drained in the ER.  That culture is now growing what looks like a community-acquired MRSA organism.  He has been on vancomycin throughout.  He still feels relatively well, but his admission blood cultures 2/2 are rapidly positive for the same MRSA organism.  He denies any other painful sites.  No back pain.  No history of cardiac disease, no artificial material.      PAST MEDICAL HISTORY:  Fairly unremarkable historically and is listed as having insulin resistance, but hemoglobin A1c is normal.  Not clear diabetes.  No major infection problems historically, including no prior abscesses of this kind.      SOCIAL AND FAMILY HISTORY:  From [place], does not know his TB status.  No family history of MRSA or similar abscess events in other family members.      MEDICATIONS:  As listed.      REVIEW OF SYSTEMS:  No major systemic symptoms, although has had low-grade temps in the 99s.  His foot is still painful, but no pain in the joint itself.      PHYSICAL EXAMINATION:   GENERAL:  The patient appears his stated age.  He does not look toxic or ill.   VITAL SIGNS:  Normal including being afebrile.     GENERAL:  He is awake, alert, oriented, and communicative, again with language barrier present.   HEENT:  No thrush or intraoral lesions.  Pupils reactive.   NECK:  Supple and nontender without lymphadenopathy or thyromegaly.   HEART:  Regular rhythm with no major murmur or gallop.   LUNGS:  Clear bilaterally.   ABDOMEN:  Soft, nontender.   EXTREMITIES:  The left medial ankle area where the abscess is has some blistering.  There is slight fluid present.  It has been draining and is still actively draining.  No other obvious skin or musculoskeletal sites of infection.      LABORATORY DATA:  Followup blood cultures are pending, just being done.  The abscess culture is growing MRSA with a typical community-acquired MRSA sensitivity.  The blood culture has been identified by gene testing as MRSA.  Full sensitivity to follow, but of  course every expectation is same organism.  White count initially 15,000, still 11,300.      Thank you very much for the consultation.  Follow up patient as inpatient.         GELA ANDINO MD             D: 2019   T: 2019   MT: MS      Name:     LUIZA BEATTY   MRN:      [id number]        Account:       [id number]   :      1955           Consult Date:  2019      Document: [id number]

## 2019-01-06 NOTE — CONSULTS
ID consult dictated IMP 1 62 yo male acute spontaneous abscess/cellulitis L ankle, bedside I and D, not in ankle not septic looking but high grade MRSA bacteremia    REC vanco, serial BC, soc sx check will need extended IV, watch ankle

## 2019-01-06 NOTE — PLAN OF CARE
A&Ox4. VSS. RA. CMS intact. Left ankle wound, dried serosanguinous drainage. Assist x1. +2 LLE edema. Lungs clear. Bowel sounds active. Japanese speaking, / device used. Normal saline infusing. IV anx. Tolerating regular diet.  C/O pain in ankle, PRN  norco given.

## 2019-01-06 NOTE — PLAN OF CARE
Patient is A&O, VSS on RA, CMS intact, up with assist of 1, regular diet, and leg elevated on pillows. On contact isolation for positive MRSA, Norco for pain control, IVF, and on Vancomycin. Will continue to monitor

## 2019-01-06 NOTE — PLAN OF CARE
Patient A/Ox4, Turkish speaking -  utilized. VSS on room air. PRN Norco x1 for left ankle pain. Up SBA, WBAT on left ankle. Ambulated in halls x2 this shift. Regular diet. BG checks discontinued, BG WDL and no sliding scale insulin given. Good urine output. Left ankle wound, new dressing in place. Blood cultures positive from 1/5, repeat cultures done today. ID following, will need long term antibiotics. SW consulted. Will continue to monitor.

## 2019-01-06 NOTE — PROGRESS NOTES
"[facility]    Hospitalist Progress Note    Date of Service (when I saw the patient): 01/06/2019    Assessment & Plan   62 y/o male with a h/o \"insulin resistance\" presented to the ED on 1/4 with LLE erythema and pain. Admitted for treatment of cellulitis    Seen with professional  throughout visit    Cellulitis  Abscess (drained in ED)  As above, presented with L ankle and foot pain. Ultrasound in ED with superficial abscess which was drained. Tmax since admission at 99.9. WBC initially 15.0->14.8->11.3  - blood cultures x 2 positive for GPC in clusters, MRSA  - continues on Vanco IV  - consult podiatry if fails to improve  - remains afebrile  - prn norco, prn acetaminophen  - ID consulted and appreciate their assistance. Will need prolonged course of IV abx. Pt without health insurance, will have S.W. Involved to help with coverage    \"insulin resistance\"  Noted on outpatient records. A1C at 5.9  - BS acceptable, will discontinue BS checks and SSI    FEN (fluids, electrolytes and nutrition): regular diet  Discussed with nursing.  DVT Prophylaxis: ambulate  Code Status: Full Code    Disposition: Expected discharge in ~2 days, pending improvement in infection as well as setting up outpatient abx    Natanael Tompkins MD  [phone] (P)  Text Page    Interval History    Doing ok. No cp/sob. Eating ok. Ambulating to some degree. Pain minimal to none.     -Data reviewed today: I reviewed all new labs and imaging results over the last 24 hours. I personally reviewed no images or EKG's today.    Physical Exam   Temp: 98.1  F (36.7  C) Temp src: Oral BP: 146/84 Pulse: 98 Heart Rate: 91 Resp: 16 SpO2: 96 % O2 Device: None (Room air)    Vitals:    01/04/19 1531   Weight: 77.1 kg (170 lb)     Vital Signs with Ranges  Temp:  [97.6  F (36.4  C)-98.3  F (36.8  C)] 98.1  F (36.7  C)  Pulse:  [84-98] 98  Heart Rate:  [87-97] 91  Resp:  [16] 16  BP: (118-146)/(72-84) 146/84  SpO2:  [96 %-97 %] 96 " %  I/O last 3 completed shifts:  In: 2690 [P.O.:960; I.V.:1730]  Out: 900 [Urine:900]    Constitutional: Alert, oriented, no acute distress  Respiratory: Lungs CTA B  Cardiovascular: RRR. No murmurs  GI: Soft, non-tender, non-disteneded, good bowel sounds  Skin/Integumen: No erythema, cyanosis or edema. Erythema involving the medial aspect of ankle as well as dorsal aspect up into shin area  Other:      Medications     sodium chloride 75 mL/hr at 01/06/19 0432       docusate sodium  100 mg Oral BID     influenza vaccine adult (product based on age)  0.5 mL Intramuscular Prior to discharge     insulin aspart  1-10 Units Subcutaneous TID w/meals     vancomycin (VANCOCIN) IV  1,500 mg Intravenous Q12H       Data   Recent Labs   Lab 01/06/19  0726 01/05/19  0733 01/04/19  1730   WBC 11.3* 14.8* 15.0*   HGB 13.5 13.6 14.9   MCV 91 90 90    310 309     --  137   POTASSIUM 4.0  --  3.8   CHLORIDE 106  --  105   CO2 25  --  26   BUN 12  --  16   CR 0.76  --  0.87   ANIONGAP 6  --  6   OLEGARIO 8.3*  --  9.2   GLC 98  --  102*       No results found for this or any previous visit (from the past 24 hour(s)).

## 2019-01-07 LAB
ANION GAP SERPL CALCULATED.3IONS-SCNC: 7 MMOL/L (ref 3–14)
BACTERIA SPEC CULT: ABNORMAL
BUN SERPL-MCNC: 16 MG/DL (ref 7–30)
CALCIUM SERPL-MCNC: 8.6 MG/DL (ref 8.5–10.1)
CHLORIDE SERPL-SCNC: 108 MMOL/L (ref 94–109)
CO2 SERPL-SCNC: 24 MMOL/L (ref 20–32)
CREAT SERPL-MCNC: 0.73 MG/DL (ref 0.66–1.25)
GFR SERPL CREATININE-BSD FRML MDRD: >90 ML/MIN/{1.73_M2}
GLUCOSE BLDC GLUCOMTR-MCNC: 107 MG/DL (ref 70–99)
GLUCOSE SERPL-MCNC: 111 MG/DL (ref 70–99)
Lab: ABNORMAL
Lab: ABNORMAL
POTASSIUM SERPL-SCNC: 4.1 MMOL/L (ref 3.4–5.3)
SODIUM SERPL-SCNC: 139 MMOL/L (ref 133–144)
SPECIMEN SOURCE: ABNORMAL
SPECIMEN SOURCE: ABNORMAL
VANCOMYCIN SERPL-MCNC: 10.7 MG/L
WBC # BLD AUTO: 7.8 10E9/L (ref 4–11)

## 2019-01-07 PROCEDURE — 00000146 ZZHCL STATISTIC GLUCOSE BY METER IP

## 2019-01-07 PROCEDURE — 80048 BASIC METABOLIC PNL TOTAL CA: CPT | Performed by: INTERNAL MEDICINE

## 2019-01-07 PROCEDURE — 90682 RIV4 VACC RECOMBINANT DNA IM: CPT | Performed by: INTERNAL MEDICINE

## 2019-01-07 PROCEDURE — 25000132 ZZH RX MED GY IP 250 OP 250 PS 637: Performed by: INTERNAL MEDICINE

## 2019-01-07 PROCEDURE — 25000128 H RX IP 250 OP 636: Performed by: INTERNAL MEDICINE

## 2019-01-07 PROCEDURE — 85048 AUTOMATED LEUKOCYTE COUNT: CPT | Performed by: INTERNAL MEDICINE

## 2019-01-07 PROCEDURE — 36415 COLL VENOUS BLD VENIPUNCTURE: CPT | Performed by: INTERNAL MEDICINE

## 2019-01-07 PROCEDURE — 12000000 ZZH R&B MED SURG/OB

## 2019-01-07 PROCEDURE — 87040 BLOOD CULTURE FOR BACTERIA: CPT | Performed by: INTERNAL MEDICINE

## 2019-01-07 PROCEDURE — 99232 SBSQ HOSP IP/OBS MODERATE 35: CPT | Performed by: INTERNAL MEDICINE

## 2019-01-07 PROCEDURE — 80202 ASSAY OF VANCOMYCIN: CPT | Performed by: INTERNAL MEDICINE

## 2019-01-07 RX ADMIN — INFLUENZA A VIRUS A/MICHIGAN/45/2015 (H1N1) RECOMBINANT HEMAGGLUTININ ANTIGEN, INFLUENZA A VIRUS A/SINGAPORE/INFIMH-16-0019/2016 (H3N2) RECOMBINANT HEMAGGLUTININ ANTIGEN, INFLUENZA B VIRUS B/MARYLAND/15/2016 RECOMBINANT HEMAGGLUTININ ANTIGEN, AND INFLUENZA B VIRUS B/PHUKET/3073/2013 RECOMBINANT HEMAGGLUTININ ANTIGEN 0.5 ML: 45; 45; 45; 45 INJECTION INTRAMUSCULAR at 08:14

## 2019-01-07 RX ADMIN — HYDROCODONE BITARTRATE AND ACETAMINOPHEN 1 TABLET: 5; 325 TABLET ORAL at 08:11

## 2019-01-07 RX ADMIN — HYDROCODONE BITARTRATE AND ACETAMINOPHEN 1 TABLET: 5; 325 TABLET ORAL at 13:04

## 2019-01-07 RX ADMIN — SODIUM CHLORIDE 1500 MG: 9 INJECTION, SOLUTION INTRAVENOUS at 16:20

## 2019-01-07 RX ADMIN — SODIUM CHLORIDE 1500 MG: 9 INJECTION, SOLUTION INTRAVENOUS at 03:50

## 2019-01-07 RX ADMIN — HYDROCODONE BITARTRATE AND ACETAMINOPHEN 1 TABLET: 5; 325 TABLET ORAL at 20:04

## 2019-01-07 RX ADMIN — HYDROCODONE BITARTRATE AND ACETAMINOPHEN 1 TABLET: 5; 325 TABLET ORAL at 03:50

## 2019-01-07 ASSESSMENT — ACTIVITIES OF DAILY LIVING (ADL)
ADLS_ACUITY_SCORE: 12

## 2019-01-07 NOTE — PLAN OF CARE
A&Ox4.  VSS on RA.  C/o of L ankle pain, PRN Norco given.  LS clear.  BS normoactive, +flatus.  L ankle dressing CDI.  CMS intact.  Up SBA with walker.  Regular diet.  ID following.  SW following.

## 2019-01-07 NOTE — PROGRESS NOTES
"[facility]  Infectious Disease Progress Note          Assessment and Plan:   IMPRESSION:   1.  A 63-year-old male with acute spontaneous left ankle medial abscess, on drainage it looks like community-acquired MRSA, not clear if it has been completely drained, no evidence of deeper involvement or ankle involvement.   2.  No major sepsis, but admission blood cultures 2/2 rapidly positive for the same MRSA organism, likely spillover from the leg, but of course as always, Staph aureus bacteremia major issue, no signs of secondary involved sites or active sepsis.      RECOMMENDATIONS:   1.  Vancomycin   2.  Serial blood cultures until clear, 2 today and then 1 daily going forward.   3.  Will need extended IV antibiotics.  Social Service to see, hold on any long line until cultures are documented clear.   4.  Continue to follow the foot.  Not clear to me that it has been adequately drained by the bedside I and D in the ER; however, as it turns out here is going to need extended IV antibiotics, so probably okay   5 Doing well, if 1/6 still neg tomorrow likely PICC and possibly only 2 weeks IV , very likely spillover from ankle wo evidence hidden/deeper/valve infection, if more + both further LAUREANO and 4 weeks IV            Interval History:   no new complaints and doing well; no cp, sob, n/v/d, or abd pain. cx Follow-up neg, no fever no new pain site              Medications:       docusate sodium  100 mg Oral BID     vancomycin (VANCOCIN) IV  1,500 mg Intravenous Q12H                  Physical Exam:   Blood pressure (!) 145/93, pulse 95, temperature 98.1  F (36.7  C), temperature source Oral, resp. rate 16, height 1.651 m (5' 5\"), weight 77.1 kg (170 lb), SpO2 95 %.  Wt Readings from Last 2 Encounters:   01/04/19 77.1 kg (170 lb)     Vital Signs with Ranges  Temp:  [97.9  F (36.6  C)-98.1  F (36.7  C)] 98.1  F (36.7  C)  Pulse:  [95] 95  Heart Rate:  [80-93] 83  Resp:  [16] 16  BP: (126-145)/(67-93) " 145/93  SpO2:  [94 %-96 %] 95 %    Constitutional: Awake, alert, cooperative, no apparent distress   Lungs: Clear to auscultation bilaterally, no crackles or wheezing   Cardiovascular: Regular rate and rhythm, normal S1 and S2, and no murmur noted   Abdomen: Normal bowel sounds, soft, non-distended, non-tender   Skin: No rashes, no cyanosis, no edema foot same   Other:           Data:   All microbiology laboratory data reviewed.  Recent Labs   Lab Test 01/07/19  0636 01/06/19  0726 01/05/19  0733 01/04/19  1730   WBC 7.8 11.3* 14.8* 15.0*   HGB  --  13.5 13.6 14.9   HCT  --  38.1* 39.5* 41.8   MCV  --  91 90 90   PLT  --  286 310 309     Recent Labs   Lab Test 01/07/19  0636 01/06/19  0726 01/04/19  1730   CR 0.73 0.76 0.87     No lab results found.  Recent Labs   Lab Test 01/06/19  1125 01/06/19  1121 01/04/19  1816 01/04/19  1730 01/04/19  1723   CULT No growth after 16 hours No growth after 16 hours Cultured on the 1st day of incubation:  Methicillin resistant Staphylococcus aureus (MRSA)  This isolate DOES NOT demonstrate inducible clindamycin resistance in vitro. Clindamycin   is susceptible and could be used when indicated, however, erythromycin is resistant and   should not be used.  *  Critical Value/Significant Value, preliminary result only, called to and read back by  Cornelia Ho RN on SH55 at 0916 on 1/5/19 ac.    (Note)  POSITIVE for STAPHYLOCOCCUS AUREUS and POSITIVE for the mecA gene  (MRSA) by [employer] mulitplex nucleic acid test. Final identification  and antimicrobial susceptibility testing will be verified by standard  methods.      Specimen tested with [employer] multiplex, gram-positive blood culture  nucleic acid test for the following targets: Staph aureus, Staph  epidermidis, Staph lugdunensis, other Staph species, Enterococcus  faecalis, Enterococcus faecium, Streptococcus species, S. agalactiae,  S. anginosus grp., S. pneumoniae, S. pyogenes, Listeria sp., mecA  (methicillin resistance)  and Leonarda/B (vancomycin resistance).      Critical Value/Significant Value called to and read back by FAUSTINO SHEIKH 01.05.2019 AT 12.07 PM,ZG   Cultured on the 1st day of incubation:  Methicillin resistant Staphylococcus aureus (MRSA)  *  Critical Value/Significant Value, preliminary result only, called to and read back by  Cornelia Sheikh RN on SH55 at 1143 on 1/5/19 ac.    Susceptibility testing done on previous specimen Heavy growth  Methicillin resistant Staphylococcus aureus (MRSA)  This isolate DOES NOT demonstrate inducible clindamycin resistance in vitro. Clindamycin   is susceptible and could be used when indicated, however, erythromycin is resistant and   should not be used.  *

## 2019-01-07 NOTE — PLAN OF CARE
A/OX4,cms intact.Up Independent/walker.Voiding well and had BM twice today.On regular diet.IV antibiotics.Dreg to left ankle CDI and elevated on pillows.Pain well control with [place].Will continue to monitor.

## 2019-01-07 NOTE — PLAN OF CARE
A&O, Greenlandic speaking.  VSS.  Ibuprofen given for headache.  CMS intact.  Tolerating regular diet.  Left ankle dressing CDI.  Contact isolation for MRSA.  ID recommending long term abx.  Cont to monitor.

## 2019-01-07 NOTE — CONSULTS
Per ID Plan, patient will receive IV antibiotic at discharge. Referral sent to [facility] to check benefits.    It was confirmed with patient along with  that he does not have Medical Insurance. Will notify MD.      Per Kiya Hawkins from [facility] the cost if paid privately:     Vanco 1500mg q12hrs=$201.14 (Per day)  Nursing=$344.20 (Per visit)     After speaking with patient with , he is unable to pay anything.

## 2019-01-07 NOTE — PROGRESS NOTES
"[facility]    Hospitalist Progress Note    Date of Service (when I saw the patient): 01/07/2019    Assessment & Plan   64 y/o male with a h/o \"insulin resistance\" presented to the ED on 1/4 with LLE erythema and pain. Admitted for treatment of cellulitis    Seen with (video) professional  throughout visit    Cellulitis  Abscess (drained in ED)  As above, presented with L ankle and foot pain. Ultrasound in ED with superficial abscess which was drained. Tmax since admission at 99.9. WBC initially 15.0->14.8->11.3->7.8 1/7  - blood cultures x 2 positive for GPC in clusters, MRSA  - continues on Vanco IV  - consult podiatry if fails to improve  - remains afebrile  - prn norco, prn acetaminophen  - ID following, appreciate assistance. Will need at least 2 weeks of abx for MRSA bacteremia. If any surveillance cultures are positive would need 4 weeks. Will be challenging to find coverage for outpt IV abx, ? Will need to stay inpatient to complete course.     \"insulin resistance\"  Noted on outpatient records. A1C at 5.9  - BS acceptable, will discontinue BS checks and SSI    FEN (fluids, electrolytes and nutrition): regular diet  Discussed with nursing.  DVT Prophylaxis: ambulate  Code Status: Full Code    Disposition: Expected discharge in ~2 days, pending improvement in infection as well as setting up outpatient abx    Natanael Tompkins MD  [phone] (P)  Text Page    Interval History    Doing ok. No cp/sob. Having BM. Denies pain c/o at this time.     -Data reviewed today: I reviewed all new labs and imaging results over the last 24 hours. I personally reviewed no images or EKG's today.    Physical Exam   Temp: 98.1  F (36.7  C) Temp src: Oral BP: (!) 145/93 Pulse: 95 Heart Rate: 83 Resp: 16 SpO2: 95 % O2 Device: None (Room air)    Vitals:    01/04/19 1531   Weight: 77.1 kg (170 lb)     Vital Signs with Ranges  Temp:  [97.9  F (36.6  C)-98.1  F (36.7  C)] 98.1  F (36.7  C)  Pulse:  [95] " 95  Heart Rate:  [80-93] 83  Resp:  [16] 16  BP: (126-145)/(67-93) 145/93  SpO2:  [94 %-96 %] 95 %  I/O last 3 completed shifts:  In: 500 [P.O.:500]  Out: 300 [Urine:300]    Constitutional: Alert, oriented, no acute distress  Respiratory: Lungs CTA B  Cardiovascular: RRR. No murmurs  GI: Soft, non-tender, non-disteneded, good bowel sounds  Skin/Integumen: No erythema, cyanosis or edema. Erythema in ankle/ foot improving.  Other:      Medications       docusate sodium  100 mg Oral BID     vancomycin (VANCOCIN) IV  1,500 mg Intravenous Q12H       Data   Recent Labs   Lab 01/07/19  0636 01/06/19  0726 01/05/19  0733 01/04/19  1730   WBC 7.8 11.3* 14.8* 15.0*   HGB  --  13.5 13.6 14.9   MCV  --  91 90 90   PLT  --  286 310 309    137  --  137   POTASSIUM 4.1 4.0  --  3.8   CHLORIDE 108 106  --  105   CO2 24 25  --  26   BUN 16 12  --  16   CR 0.73 0.76  --  0.87   ANIONGAP 7 6  --  6   OLEGARIO 8.6 8.3*  --  9.2   * 98  --  102*       No results found for this or any previous visit (from the past 24 hour(s)).

## 2019-01-08 LAB
CREAT SERPL-MCNC: 0.84 MG/DL (ref 0.66–1.25)
GFR SERPL CREATININE-BSD FRML MDRD: >90 ML/MIN/{1.73_M2}
GLUCOSE BLDC GLUCOMTR-MCNC: 93 MG/DL (ref 70–99)
WBC # BLD AUTO: 7.3 10E9/L (ref 4–11)

## 2019-01-08 PROCEDURE — 99207 ZZC CDG-MDM COMPONENT: MEETS LOW - DOWN CODED: CPT | Performed by: INTERNAL MEDICINE

## 2019-01-08 PROCEDURE — 25000132 ZZH RX MED GY IP 250 OP 250 PS 637: Performed by: INTERNAL MEDICINE

## 2019-01-08 PROCEDURE — 00000146 ZZHCL STATISTIC GLUCOSE BY METER IP

## 2019-01-08 PROCEDURE — 82565 ASSAY OF CREATININE: CPT | Performed by: INTERNAL MEDICINE

## 2019-01-08 PROCEDURE — 99231 SBSQ HOSP IP/OBS SF/LOW 25: CPT | Performed by: INTERNAL MEDICINE

## 2019-01-08 PROCEDURE — 36415 COLL VENOUS BLD VENIPUNCTURE: CPT | Performed by: INTERNAL MEDICINE

## 2019-01-08 PROCEDURE — 85048 AUTOMATED LEUKOCYTE COUNT: CPT | Performed by: INTERNAL MEDICINE

## 2019-01-08 PROCEDURE — 87040 BLOOD CULTURE FOR BACTERIA: CPT | Performed by: INTERNAL MEDICINE

## 2019-01-08 PROCEDURE — 25000128 H RX IP 250 OP 636: Performed by: INTERNAL MEDICINE

## 2019-01-08 PROCEDURE — 12000000 ZZH R&B MED SURG/OB

## 2019-01-08 RX ADMIN — HYDROCODONE BITARTRATE AND ACETAMINOPHEN 1 TABLET: 5; 325 TABLET ORAL at 00:02

## 2019-01-08 RX ADMIN — SODIUM CHLORIDE 2000 MG: 9 INJECTION, SOLUTION INTRAVENOUS at 16:17

## 2019-01-08 RX ADMIN — ACETAMINOPHEN 650 MG: 325 TABLET, FILM COATED ORAL at 21:52

## 2019-01-08 RX ADMIN — IBUPROFEN 600 MG: 600 TABLET, FILM COATED ORAL at 02:08

## 2019-01-08 RX ADMIN — DOCUSATE SODIUM 100 MG: 100 CAPSULE, LIQUID FILLED ORAL at 20:21

## 2019-01-08 RX ADMIN — HYDROCODONE BITARTRATE AND ACETAMINOPHEN 1 TABLET: 5; 325 TABLET ORAL at 15:03

## 2019-01-08 RX ADMIN — SODIUM CHLORIDE 2000 MG: 9 INJECTION, SOLUTION INTRAVENOUS at 04:26

## 2019-01-08 ASSESSMENT — ACTIVITIES OF DAILY LIVING (ADL)
ADLS_ACUITY_SCORE: 13
ADLS_ACUITY_SCORE: 12
ADLS_ACUITY_SCORE: 13
ADLS_ACUITY_SCORE: 12
ADLS_ACUITY_SCORE: 11
ADLS_ACUITY_SCORE: 13

## 2019-01-08 NOTE — PROGRESS NOTES
"Care Coordination:    Per Dr. Dotson, pt should discharge with IV antibiotics.     Per prior notes, pt has no insurance.      Contacted Financial Counselor as pt might be eligible for EMA (which would help with hospital bill only), but need full assessment to determine if eligible for more comprehensive insurance.      When options for post-hospital antibiotic recommendations are written, I can obtain out of pocket quotes from various sources.    ADDENDUM 1:   Financial Counselor Andre ([phone]) met with patient, and is working on EMA application.      Please note 1/7 Care Coordinator brief consult,   [facility] reportedly \"not an option\" for pt due to cost,    Vanco 1500mg q12hrs=$201.14 (Per day)  Nursing=$344.20 (Per visit)   After speaking with patient with , he is unable to pay anything.    Nothing will be covered outside the hospital.  Options,   1) we can see if any homecares have zora care programs (I alerted [facility] liaison Shelli Alex, [phone])  2) pt can fund-raise in the community   3) MDs can Rx most cost effective options to treat pt conditions, please request assistance as needed for obtaining cost estimates  4) will involve discharge pharmacy liaison to determine coupons / one-time med assistance programs (left VM for Cornelia [phone])    ADDENDUM 2:  [facility] Liaison is looking in to zora care options.   [facility] states  Vanco 1500mg q12h = $58/day for drug & supplies,   Nursing = $90 for the first 2 hours, $45 any additional hours.   They will check in to see if pt qualifies for the financial hardship application.  Quote provided by Naty [phone] (out of the office 1/9/19)   Main office number [phone], Hospital Liaison Jael [phone], Fax [phone]  Discharge Pharmacy Liaison Cornelia has pt on her \"radar\" for assisting with discharge meds.    Did not meet with patient today due to time constraints and no set antibiotic Rx " plan.  Will continue to follow and meet with pt when more is known.      Leona Thorpe RN, BSN  Critical access hospital Care Coordinator   Mobile Phone: [phone]

## 2019-01-08 NOTE — PLAN OF CARE
Pt A/Ox4, Vss on RA. Yi speaking only, CMS intact, independent, denies pain, will continue to monitor.

## 2019-01-08 NOTE — PLAN OF CARE
Pt A/o x4.  SBA to bathroom. VSS on RA. Greek speaking. Voiding. Tolerating reg diet. Contact prec. Maintained. CMS intact. Dressing CDI. Intermittent Abx. Pain controlled w/ Norco, 1 dose of Ibuprofen give for HA. Plan is for PICC placement today and possible discharge home. Will continue to monitor.

## 2019-01-08 NOTE — PHARMACY-VANCOMYCIN DOSING SERVICE
Pharmacy Vancomycin Note  Date of Service 2019  Patient's  1955   63 year old, male    Indication: MRSA  Goal Trough Level: 15-20 mg/L  Day of Therapy: 3  Current Vancomycin regimen:  1500 mg IV q12h    Current estimated CrCl = Estimated Creatinine Clearance: 101.8 mL/min (based on SCr of 0.73 mg/dL).    Creatinine for last 3 days  2019:  7:26 AM Creatinine 0.76 mg/dL  2019:  6:36 AM Creatinine 0.73 mg/dL    Recent Vancomycin Levels (past 3 days)  2019:  3:10 PM Vancomycin Level 10.7 mg/L    Vancomycin IV Administrations (past 72 hours)                   vancomycin (VANCOCIN) 1,500 mg in sodium chloride 0.9 % 250 mL intermittent infusion (mg) 1,500 mg New Bag 19 1620     1,500 mg New Bag  0350     1,500 mg New Bag 19 1606     1,500 mg New Bag  0426     1,500 mg New Bag 19 1501                Nephrotoxins and other renal medications (From now, onward)    Start     Dose/Rate Route Frequency Ordered Stop    19 0400  vancomycin (VANCOCIN) 2,000 mg in sodium chloride 0.9 % 500 mL intermittent infusion      2,000 mg  over 2 Hours Intravenous EVERY 12 HOURS 19 1905      19 2040  ibuprofen (ADVIL/MOTRIN) tablet 600 mg      600 mg Oral 3 TIMES DAILY PRN 19 2040               Contrast Orders - past 72 hours (72h ago, onward)    None          Interpretation of levels and current regimen:  Trough level is  Subtherapeutic    Has serum creatinine changed > 50% in last 72 hours: No    Urine output:  good urine output    Renal Function: Stable    Plan:  1.  Increase Dose to 2000 mg IV q12h  2.  Pharmacy will check trough levels as appropriate in 1-3 Days.    3. Serum creatinine levels will be ordered daily for the first week of therapy and at least twice weekly for subsequent weeks.      Paulina Del Valle (Kelly)        .

## 2019-01-08 NOTE — PROGRESS NOTES
"[facility]  Infectious Disease Progress Note          Assessment and Plan:   IMPRESSION:   1.  A 63-year-old male with acute spontaneous left ankle medial abscess, on drainage it looks like community-acquired MRSA, not clear if it has been completely drained, no evidence of deeper involvement or ankle involvement.   2.  No major sepsis, but admission blood cultures 2/2 rapidly positive for the same MRSA organism, likely spillover from the leg, but of course as always, Staph aureus bacteremia major issue, no signs of secondary involved sites or active sepsis.      RECOMMENDATIONS:   1.  Vancomycin, day 6 today   2.  Serial blood cultures until clear, 2 today and then 1 daily , so far all neg   3.  Will need extended IV antibiotics.  Social Service to see, hold on any long line until cultures are documented clear, wait 1 day more, major disposition issue, no ins and / not citizen , await more investigation options.   4.  Continue to follow the foot.  Not clear to me that it has been adequately drained by the bedside I and D in the ER; however, as it turns out here is going to need extended IV antibiotics, so likely Ok and now looks better  5 Plan is PICC and possibly only 2 weeks IV , very likely spillover from ankle wo evidence hidden/deeper/valve infection, if more + both further LAUREANO and 4 weeks IV, all of this now problematic            Interval History:   no new complaints and doing well; no cp, sob, n/v/d, or abd pain. cx Follow-up neg, no fever no new pain site , no ins              Medications:       docusate sodium  100 mg Oral BID     vancomycin (VANCOCIN) IV  2,000 mg Intravenous Q12H                  Physical Exam:   Blood pressure 145/88, pulse 74, temperature 97.8  F (36.6  C), temperature source Oral, resp. rate 16, height 1.651 m (5' 5\"), weight 77.1 kg (170 lb), SpO2 96 %.  Wt Readings from Last 2 Encounters:   01/04/19 77.1 kg (170 lb)     Vital Signs with Ranges  Temp:  [97.8 "  F (36.6  C)-98.3  F (36.8  C)] 97.8  F (36.6  C)  Pulse:  [74] 74  Heart Rate:  [79-81] 81  Resp:  [16-18] 16  BP: (140-145)/(87-92) 145/88  SpO2:  [95 %-96 %] 96 %    Constitutional: Awake, alert, cooperative, no apparent distress   Lungs: Clear to auscultation bilaterally, no crackles or wheezing   Cardiovascular: Regular rate and rhythm, normal S1 and S2, and no murmur noted   Abdomen: Normal bowel sounds, soft, non-distended, non-tender   Skin: No rashes, no cyanosis, no edema foot same   Other:           Data:   All microbiology laboratory data reviewed.  Recent Labs   Lab Test 01/08/19  0708 01/07/19  0636 01/06/19  0726 01/05/19  0733 01/04/19  1730   WBC 7.3 7.8 11.3* 14.8* 15.0*   HGB  --   --  13.5 13.6 14.9   HCT  --   --  38.1* 39.5* 41.8   MCV  --   --  91 90 90   PLT  --   --  286 310 309     Recent Labs   Lab Test 01/08/19  0708 01/07/19  0636 01/06/19  0726   CR 0.84 0.73 0.76     No lab results found.  Recent Labs   Lab Test 01/07/19  0635 01/06/19  1125 01/06/19  1121 01/04/19  1816 01/04/19  1730 01/04/19  1723   CULT No growth after 20 hours No growth after 2 days No growth after 2 days Cultured on the 1st day of incubation:  Methicillin resistant Staphylococcus aureus (MRSA)  This isolate DOES NOT demonstrate inducible clindamycin resistance in vitro. Clindamycin   is susceptible and could be used when indicated, however, erythromycin is resistant and   should not be used.  *  Critical Value/Significant Value, preliminary result only, called to and read back by  Cornelia Ho RN on SH55 at 0916 on 1/5/19 ac.    (Note)  POSITIVE for STAPHYLOCOCCUS AUREUS and POSITIVE for the mecA gene  (MRSA) by [employer] mulitplex nucleic acid test. Final identification  and antimicrobial susceptibility testing will be verified by standard  methods.      Specimen tested with [employer] multiplex, gram-positive blood culture  nucleic acid test for the following targets: Staph aureus, Staph  epidermidis, Staph  lugdunensis, other Staph species, Enterococcus  faecalis, Enterococcus faecium, Streptococcus species, S. agalactiae,  S. anginosus grp., S. pneumoniae, S. pyogenes, Listeria sp., mecA  (methicillin resistance) and Leonarda/B (vancomycin resistance).      Critical Value/Significant Value called to and read back by FAUSTINO SHEIKH 01.05.2019 AT 12.07 PM,ZG   Cultured on the 1st day of incubation:  Methicillin resistant Staphylococcus aureus (MRSA)  *  Critical Value/Significant Value, preliminary result only, called to and read back by  Cornelia Sheikh RN on SH55 at 1143 on 1/5/19 ac.    Susceptibility testing done on previous specimen Heavy growth  Methicillin resistant Staphylococcus aureus (MRSA)  This isolate DOES NOT demonstrate inducible clindamycin resistance in vitro. Clindamycin   is susceptible and could be used when indicated, however, erythromycin is resistant and   should not be used.  *

## 2019-01-08 NOTE — PLAN OF CARE
VSS, CMS intact. Edema LLE. Denies pain this shift. Norco and Ibuprofen available. Up independently with walker. Moderate drainage, dressing changed. On vanco. Alert and orient x 4. Lithuanian speaking. ID and hospitalist following.

## 2019-01-09 LAB
CREAT SERPL-MCNC: 0.8 MG/DL (ref 0.66–1.25)
GFR SERPL CREATININE-BSD FRML MDRD: >90 ML/MIN/{1.73_M2}
GLUCOSE BLDC GLUCOMTR-MCNC: 115 MG/DL (ref 70–99)
VANCOMYCIN SERPL-MCNC: 15.9 MG/L

## 2019-01-09 PROCEDURE — 36415 COLL VENOUS BLD VENIPUNCTURE: CPT | Performed by: INTERNAL MEDICINE

## 2019-01-09 PROCEDURE — 12000000 ZZH R&B MED SURG/OB

## 2019-01-09 PROCEDURE — 80202 ASSAY OF VANCOMYCIN: CPT | Performed by: INTERNAL MEDICINE

## 2019-01-09 PROCEDURE — 25000128 H RX IP 250 OP 636: Performed by: INTERNAL MEDICINE

## 2019-01-09 PROCEDURE — 99232 SBSQ HOSP IP/OBS MODERATE 35: CPT | Performed by: INTERNAL MEDICINE

## 2019-01-09 PROCEDURE — 82565 ASSAY OF CREATININE: CPT | Performed by: INTERNAL MEDICINE

## 2019-01-09 PROCEDURE — G0463 HOSPITAL OUTPT CLINIC VISIT: HCPCS

## 2019-01-09 PROCEDURE — 25000132 ZZH RX MED GY IP 250 OP 250 PS 637: Performed by: INTERNAL MEDICINE

## 2019-01-09 PROCEDURE — 87040 BLOOD CULTURE FOR BACTERIA: CPT | Performed by: INTERNAL MEDICINE

## 2019-01-09 PROCEDURE — 00000146 ZZHCL STATISTIC GLUCOSE BY METER IP

## 2019-01-09 RX ADMIN — HYDROCODONE BITARTRATE AND ACETAMINOPHEN 1 TABLET: 5; 325 TABLET ORAL at 15:54

## 2019-01-09 RX ADMIN — SODIUM CHLORIDE 2000 MG: 9 INJECTION, SOLUTION INTRAVENOUS at 04:16

## 2019-01-09 RX ADMIN — HYDROCODONE BITARTRATE AND ACETAMINOPHEN 1 TABLET: 5; 325 TABLET ORAL at 10:47

## 2019-01-09 RX ADMIN — HYDROCODONE BITARTRATE AND ACETAMINOPHEN 1 TABLET: 5; 325 TABLET ORAL at 19:58

## 2019-01-09 RX ADMIN — ACETAMINOPHEN 650 MG: 325 TABLET, FILM COATED ORAL at 02:01

## 2019-01-09 RX ADMIN — SODIUM CHLORIDE 2000 MG: 9 INJECTION, SOLUTION INTRAVENOUS at 15:54

## 2019-01-09 ASSESSMENT — ACTIVITIES OF DAILY LIVING (ADL)
ADLS_ACUITY_SCORE: 12

## 2019-01-09 NOTE — PLAN OF CARE
Pt A/O x4 and is Macedonian speaking. CMS intact; Dressing CDI. VSS on RA. Up independently to the BR; Voiding adequately. Taking only PRN tylenol for pain, but main complaint was only a headache. IV SL; vanco received at 0400. Continue to monitor.

## 2019-01-09 NOTE — PROGRESS NOTES
"[facility] Nurse Inpatient Wound Assessment     Initial Assessment of wound(s) on pt's:   Left medial ankle        Data:   Patient History:      per MD note(s): 62 y/o male with a h/o \"insulin resistance\" presented to the ED on 1/4 with LLE erythema and pain.        José Luis Assessment and sub scores:     Sensory Perception: 3-->slightly limited    Moisture: 4-->rarely moist   Activity: 3-->walks occasionally     Mobility: 3-->slightly limited   Nutrition: 3-->adequate   José Luis Score: 19       Current Diet / Nutrition:       Orders Placed This Encounter        Regular Diet Adult        Labs:   Recent Labs   Lab Test 01/08/19  0708  01/06/19  0726  01/04/19  1730   HGB  --   --  13.5   < > 14.9   WBC 7.3   < > 11.3*   < > 15.0*   A1C  --   --   --   --  5.9*    < > = values in this interval not displayed.       Wound Assessment (location):   Left medial ankle  Wound History:  Pt states he noticed a blister and redness about 9 days ago.  He is not aware of any precipitating factors, ie trauma, bug bite, etc.  Wound was lanced in ED.  Pt states current gauze dressing had been on for about 3 days.      Wound Base: full base not visible.  Appears pink/red where visible.      Specific Dimensions (length x width x depth, in cm) :   Approx 0.5 x 0.5 x 0.5+cm    Tunneling:  N/A    Undermining: up to 1.6+cm proximally/ toward 10-11 o'clock, with slight pocketing all around      Palpation of the wound bed: not really fluctuant, but feels swollen and like there is a little pocket that is still collecting/producing a bit of purulent drainage.  Wound base feels like a fairly firm end-point.     Slough appearance:  none    Eschar appearance:  none    Periwound Skin: edema and erythema, proximal to wound is an approx 1.5 x 1.5 x 0cm dark red scabby area, which is not fluctuant but when this area palpated it causes a little purulent fluid to express out of the smaller wound opening    Color: " "reddened    Temperature  normal     Drainage:  Small creamy/purulent     Odor: none    Pain:  minimal and moderate with probing/packing    [facility] photo of left ankle 1-9-19:                Intervention:     Patient's chart evaluated.      Wound(s) assessed    Wound Care:  Cleansed and dressed with small amount Iodofoam packing and Mepilex    Orders  Written    Supplies gathered; ordered 1/4\" iodoform gauze    Discussed plan of care with Patient, Nurse and Physician Dr. Dotson             All patient / family questions answered:  YES;  was present          Assessment:          Left ankle wound of unclear initial etiology.  Lanced in ED 5 days ago, but continues to have a small pocket with small amount purulent fluid.  Will start daily wound cares with packing and monitor for improvement vs need for further debridement.          Plan:     Nursing to notify the Provider(s) and re-consult the [facility] Nurse if wound(s) deteriorate(s) or if the wound care plan needs reevaluation.      Wound care to left ankle: Daily and prn:  1.  Cleanse with MicroKlenz.   2.  Gently probe wound with qtip to identify depth/direction of pocketing.  Then pack 1/4\" Iodoform gauze into the pocket.  Leave an inch or so hanging out for easy removal.    3.  Cover with Mepilex or gauze and tape.  Label with time/date/initials.     [facility] Nurse will return: will follow-up by end of week       "

## 2019-01-09 NOTE — PROGRESS NOTES
Care Coordination:    Still need final antibiotic recommendation to get final quotes and plan for IV antibiotics outside the hospital.     Will talk to pt re: possible scenarios, given only eligible for EMA (hospital-only) insurance.    Leona Thorpe RN, BSN  [facility], [facility]1983 Care Coordinator   Mobile Phone: [phone]

## 2019-01-09 NOTE — PROGRESS NOTES
"[facility]  Infectious Disease Progress Note          Assessment and Plan:   IMPRESSION:   1.  A 63-year-old male with acute spontaneous left ankle medial abscess, on drainage it looks like community-acquired MRSA, not clear if it has been completely drained, no evidence of deeper involvement or ankle involvement.   2.  No major sepsis, but admission blood cultures 2/2 rapidly positive for the same MRSA organism, likely spillover from the leg, but of course as always, Staph aureus bacteremia major issue, no signs of secondary involved sites or active sepsis.      RECOMMENDATIONS:   1.  Vancomycin, day 6 today   2.  Serial blood cultures until clear, 2 today and then 1 daily , so far all neg   3.  Will need extended IV antibiotics.  Social Service to see, hold on any long line until cultures are documented clear, wait 1 day more, major disposition issue, no ins and / not citizen , await more investigation options.   4.  Continue to follow the foot.  Not clear to me that it has been adequately drained by the bedside I and D in the ER; however, as it turns out here is going to need extended IV antibiotics, so likely Ok and now looks better  5 Plan is PICC and possibly only 2 weeks IV , very likely spillover from ankle wo evidence hidden/deeper/valve infection, if more + both further LAUREANO and 4 weeks IV, all of this now problematic, looks like attempt at 2 weeks Ok but major coverage issue            Interval History:   no new complaints and doing well; no cp, sob, n/v/d, or abd pain. cx Follow-up neg, no fever no new pain site , no ins              Medications:       docusate sodium  100 mg Oral BID     vancomycin (VANCOCIN) IV  2,000 mg Intravenous Q12H                  Physical Exam:   Blood pressure 139/85, pulse 68, temperature 98.3  F (36.8  C), temperature source Oral, resp. rate 16, height 1.651 m (5' 5\"), weight 77.1 kg (170 lb), SpO2 97 %.  Wt Readings from Last 2 Encounters:   01/04/19 " 77.1 kg (170 lb)     Vital Signs with Ranges  Temp:  [97.8  F (36.6  C)-98.6  F (37  C)] 98.3  F (36.8  C)  Pulse:  [68-69] 68  Heart Rate:  [71-83] 71  Resp:  [16-18] 16  BP: (134-156)/(73-86) 139/85  SpO2:  [95 %-97 %] 97 %    Constitutional: Awake, alert, cooperative, no apparent distress   Lungs: Clear to auscultation bilaterally, no crackles or wheezing   Cardiovascular: Regular rate and rhythm, normal S1 and S2, and no murmur noted   Abdomen: Normal bowel sounds, soft, non-distended, non-tender   Skin: No rashes, no cyanosis, no edema foot same   Other:           Data:   All microbiology laboratory data reviewed.  Recent Labs   Lab Test 01/08/19  0708 01/07/19  0636 01/06/19  0726 01/05/19  0733 01/04/19  1730   WBC 7.3 7.8 11.3* 14.8* 15.0*   HGB  --   --  13.5 13.6 14.9   HCT  --   --  38.1* 39.5* 41.8   MCV  --   --  91 90 90   PLT  --   --  286 310 309     Recent Labs   Lab Test 01/09/19  0722 01/08/19  0708 01/07/19  0636   CR 0.80 0.84 0.73     No lab results found.  Recent Labs   Lab Test 01/09/19  0721 01/08/19  0707 01/07/19  0635 01/06/19  1125 01/06/19  1121 01/04/19  1816 01/04/19  1730 01/04/19  1723   CULT PENDING No growth after 20 hours No growth after 2 days No growth after 3 days No growth after 3 days Cultured on the 1st day of incubation:  Methicillin resistant Staphylococcus aureus (MRSA)  This isolate DOES NOT demonstrate inducible clindamycin resistance in vitro. Clindamycin   is susceptible and could be used when indicated, however, erythromycin is resistant and   should not be used.  *  Critical Value/Significant Value, preliminary result only, called to and read back by  Cornelia Ho RN on SH55 at 0916 on 1/5/19 ac.    (Note)  POSITIVE for STAPHYLOCOCCUS AUREUS and POSITIVE for the mecA gene  (MRSA) by [employer] mulitplex nucleic acid test. Final identification  and antimicrobial susceptibility testing will be verified by standard  methods.      Specimen tested with [employer]  multiplex, gram-positive blood culture  nucleic acid test for the following targets: Staph aureus, Staph  epidermidis, Staph lugdunensis, other Staph species, Enterococcus  faecalis, Enterococcus faecium, Streptococcus species, S. agalactiae,  S. anginosus grp., S. pneumoniae, S. pyogenes, Listeria sp., mecA  (methicillin resistance) and Leonarda/B (vancomycin resistance).      Critical Value/Significant Value called to and read back by FAUSTINO SHEIKH 01.05.2019 AT 12.07 PM,ZG   Cultured on the 1st day of incubation:  Methicillin resistant Staphylococcus aureus (MRSA)  *  Critical Value/Significant Value, preliminary result only, called to and read back by  Cornelia Sheikh RN on SH55 at 1143 on 1/5/19 ac.    Susceptibility testing done on previous specimen Heavy growth  Methicillin resistant Staphylococcus aureus (MRSA)  This isolate DOES NOT demonstrate inducible clindamycin resistance in vitro. Clindamycin   is susceptible and could be used when indicated, however, erythromycin is resistant and   should not be used.  *

## 2019-01-09 NOTE — PROGRESS NOTES
"[facility]    Hospitalist Progress Note    Date of Service (when I saw the patient): 01/08/2019    Assessment & Plan   64 y/o male with a h/o \"insulin resistance\" presented to the ED on 1/4 with LLE erythema and pain. Admitted for treatment of cellulitis    Seen with professional  throughout visit    Cellulitis  Abscess (drained in ED)  As above, presented with L ankle and foot pain. Ultrasound in ED with superficial abscess which was drained. Tmax since admission at 99.9. WBC initially 15.0->14.8->11.3->7.8 1/7  - blood cultures x 2 positive for GPC in clusters, MRSA  - continues on Vanco IV  - remains afebrile  - prn norco, prn acetaminophen  - ID following and greatly appreciate their assistance  - surveillance blood cultures daily, negative thus far  - plans for 2 weeks IV vanco if surveillance negative, 4 weeks if positive surveillance  - foot infection appears to be improving nicely, pain improving  - no insurance, undocumented making payment challenging and quite burdensome for Mr. Newberry    \"insulin resistance\"  Noted on outpatient records. A1C at 5.9  - BS acceptable, will discontinue BS checks and SSI    FEN (fluids, electrolytes and nutrition): regular diet  Discussed with nursing.  DVT Prophylaxis: ambulate  Code Status: Full Code    Disposition: Expected discharge unclear given insurance/ cost issues    Natanael Tompkins MD  [phone] (P)  Text Page    Interval History    Doing well. Feels as if pain improving. Denies cp/sob. Ambulating well.     -Data reviewed today: I reviewed all new labs and imaging results over the last 24 hours. I personally reviewed no images or EKG's today.    Physical Exam   Temp: 97.8  F (36.6  C) Temp src: Oral BP: 140/73 Pulse: 69 Heart Rate: 81 Resp: 18 SpO2: 96 % O2 Device: None (Room air)    Vitals:    01/04/19 1531   Weight: 77.1 kg (170 lb)     Vital Signs with Ranges  Temp:  [97.8  F (36.6  C)-98.1  F (36.7  C)] 97.8  F (36.6  C)  Pulse: "  [69-77] 69  Heart Rate:  [81] 81  Resp:  [16-18] 18  BP: (137-145)/(73-88) 140/73  SpO2:  [95 %-97 %] 96 %  I/O last 3 completed shifts:  In: 800 [P.O.:800]  Out: -     Constitutional: Alert, oriented, no acute distress  Respiratory: Lungs CTA B  Cardiovascular: RRR. No murmurs  GI: Soft, non-tender, non-disteneded, good bowel sounds  Skin/Integumen: No erythema, cyanosis or edema. Erythema in ankle/ foot improving.  Other:      Medications       docusate sodium  100 mg Oral BID     vancomycin (VANCOCIN) IV  2,000 mg Intravenous Q12H       Data   Recent Labs   Lab 01/08/19  0708 01/07/19  0636 01/06/19  0726 01/05/19  0733 01/04/19  1730   WBC 7.3 7.8 11.3* 14.8* 15.0*   HGB  --   --  13.5 13.6 14.9   MCV  --   --  91 90 90   PLT  --   --  286 310 309   NA  --  139 137  --  137   POTASSIUM  --  4.1 4.0  --  3.8   CHLORIDE  --  108 106  --  105   CO2  --  24 25  --  26   BUN  --  16 12  --  16   CR 0.84 0.73 0.76  --  0.87   ANIONGAP  --  7 6  --  6   OLEGARIO  --  8.6 8.3*  --  9.2   GLC  --  111* 98  --  102*       No results found for this or any previous visit (from the past 24 hour(s)).

## 2019-01-09 NOTE — PROGRESS NOTES
"[facility]    Hospitalist Progress Note    Date of Service (when I saw the patient): 01/09/2019    Assessment & Plan   64 y/o male with a h/o \"insulin resistance\" presented to the ED on 1/4 with LLE erythema and pain.     Left ankle abscess with Cellulitis  MRSA bacteriemia  - presented with L ankle and foot pain. Ultrasound in ED with superficial abscess which was drained.  - started initially on Zosyn/Vanco  - WBC initially 15.0->14.8->11.3->7.8 1/7  - ID consult appreciated  - BC from 01/04- 2/2 bottles positive for MRSA; repeat BC- NGTD  - fluid culture- also positive for MRSA  - currently on iv Vanco  - pain control- prn norco, prn acetaminophen  - afebrile  - surveillance blood cultures daily, negative thus far  - plans for 2 weeks IV vanco if surveillance negative, 4 weeks if positive surveillance  - no insurance, undocumented making payment challenging   - wound care consult  - SW and CC help appreciated    \"Insulin resistance\"  Noted on outpatient records. A1C at 5.9  - BS acceptable 93--115  - discontinued BS checks and SSI    FEN (fluids, electrolytes and nutrition): regular diet  Discussed with nursing.  DVT Prophylaxis: ambulate  Code Status: Full Code    Disposition: Expected discharge unclear given insurance/ cost issues    Jessica Prado MD    Interval History    Doing  Fine, no events overnight; pain controlled; denies cp/sob. Ambulating well; discussed with son over the phone and bedside RN    -Data reviewed today: I reviewed all new labs and imaging results over the last 24 hours. I personally reviewed no images or EKG's today.    Physical Exam   Temp: 98.3  F (36.8  C) Temp src: Oral BP: 139/85 Pulse: 68 Heart Rate: 71 Resp: 16 SpO2: 97 % O2 Device: None (Room air)    Vitals:    01/04/19 1531   Weight: 77.1 kg (170 lb)     Vital Signs with Ranges  Temp:  [97.8  F (36.6  C)-98.6  F (37  C)] 98.3  F (36.8  C)  Pulse:  [68-69] 68  Heart Rate:  [71-83] 71  Resp:  [16-18] " 16  BP: (134-156)/(73-86) 139/85  SpO2:  [95 %-97 %] 97 %  I/O last 3 completed shifts:  In: 550 [P.O.:550]  Out: -     Constitutional: Alert, oriented, no acute distress  Respiratory: bilateral air entry, no wheezing, no rales, no crackles  Cardiovascular: RRR. No murmurs  GI: Soft, non-tender, non-disteneded, good bowel sounds  Skin/Integumen: left ankle wrapped; erythema in ankle/ foot improving.  Other:      Medications       docusate sodium  100 mg Oral BID     vancomycin (VANCOCIN) IV  2,000 mg Intravenous Q12H       Data   Recent Labs   Lab 01/09/19  0722 01/08/19  0708 01/07/19  0636 01/06/19  0726 01/05/19  0733 01/04/19  1730   WBC  --  7.3 7.8 11.3* 14.8* 15.0*   HGB  --   --   --  13.5 13.6 14.9   MCV  --   --   --  91 90 90   PLT  --   --   --  286 310 309   NA  --   --  139 137  --  137   POTASSIUM  --   --  4.1 4.0  --  3.8   CHLORIDE  --   --  108 106  --  105   CO2  --   --  24 25  --  26   BUN  --   --  16 12  --  16   CR 0.80 0.84 0.73 0.76  --  0.87   ANIONGAP  --   --  7 6  --  6   OLEGARIO  --   --  8.6 8.3*  --  9.2   GLC  --   --  111* 98  --  102*       No results found for this or any previous visit (from the past 24 hour(s)).

## 2019-01-09 NOTE — CONSULTS
"Care Coordination:    Met with patient in room, introduced self and role via .  Discussed the followin. \"Insurance\"      + Reviewed with patient that Andre from [employer] helped pt apply for EMA yesterday.       + Reviewed facts that EMA covers in-hospital costs only, not anything outside the hospital.     2. \"IV antibiotics\"      + Reviewed quotes obtained thus far with the current med, understanding \"this may change if the antibiotic orders change.\"   Again,     [facility]  Vanco 1500mg q12hrs=$201.14 (Per day)  Nursing=$344.20 (Per visit)   Quote provided by [facility] Intake   Office [phone], Fax [phone]  If pt's EMA is approved, he may qualify for financial assistance.  [facility] liaison Shelli Alex, [phone]    [facility]   Vanco 1500mg q12h = $58/day for drug & supplies,   Nursing = $90 for the first 2 hours, $45 any additional hours.   They will check in to see if pt qualifies for the financial hardship application.  Quote provided by Naty [phone] (out of the office 19)   Main office number [phone], Fax [phone]  Hospital Liaison Jael [phone]        + Pt aware that the antibiotics will have a cost, and both will try to see if he qualifies for any assistance programs. However, if not, I encouraged him to check in with his [employer] community (he attends a [employer] in [place]) to see if they also have any one-time assistance funds available, or to consider creating a  (some patients have done gofundme campaigns) to help cover the cost.  I let him know that I also alerted the discharge pharmacy liaison to review discharge meds and see if there are any coupons, etc at time of discharge.     3. \"Follow-up providers\"      + Patient does not have a PCP.        + His son uses [facility] [place]      + [facility] is closest to him.       + He is also interested in a list of " low-cost/low-income clinics as he will be private pay.       + He understands he may need to pay for an out-of-pocket visit to Dr. Dotson / Infectious disease, or perhaps Dr. Dotson will only need to be updated on results / etc.via phone.  We will not know until discharge recommendations are made.     Care Transition Initial Assessment - RN  Met with: Patient.  DATA   Principal Problem:    Cellulitis and abscess of left ankle  Active Problems:    Insulin resistance -- 2012    Cellulitis of left ankle       Cognitive Status: awake, alert and oriented.  Contact information and PCP information verified: Yes, has no PCP, needs one   Lives With: spouse   Insurance concerns: EMA only     ASSESSMENT  Patient currently receives the following services:  NONE     Identified issues/concerns regarding health management:   + Is EMA only, thus will not have any insurance coverage out of the hospital.   + Pt does not have a lot of financial resources.    PLAN  Financial costs for the patient include:   pt will need to pay out-of-pocket for IV antibiotics, supplies, nursing visits, MD appointments, labs, etc.  Expensive, likely in the thousands of dollars.  Patient given options and choices for discharge: Yes as described in meeting above.  Patient/family is agreeable to the plan?  Yes: he appreciates the work done thus far.  Patient anticipates discharging to: Home with self-administered IV antibiotics.   Patient anticipates needs for home equipment: Yes, home infusion supplies  Plan/Disposition: Home   Appointments:     + Will need PCP appointments scheduled, and quote for low-cost  + Depending on cost effectiveness, may need labs scheduled either with out-of-pocket Home Care RN or out-of-pocket clinic.     Care  (CTS) will continue to follow.      Leona Thorpe RN, BSN  [facility] Care Coordinator   Mobile Phone: [phone]

## 2019-01-10 LAB
CREAT SERPL-MCNC: 0.8 MG/DL (ref 0.66–1.25)
GFR SERPL CREATININE-BSD FRML MDRD: >90 ML/MIN/{1.73_M2}

## 2019-01-10 PROCEDURE — 36569 INSJ PICC 5 YR+ W/O IMAGING: CPT

## 2019-01-10 PROCEDURE — 25000128 H RX IP 250 OP 636: Performed by: INTERNAL MEDICINE

## 2019-01-10 PROCEDURE — 99232 SBSQ HOSP IP/OBS MODERATE 35: CPT | Performed by: INTERNAL MEDICINE

## 2019-01-10 PROCEDURE — 36415 COLL VENOUS BLD VENIPUNCTURE: CPT | Performed by: INTERNAL MEDICINE

## 2019-01-10 PROCEDURE — 82565 ASSAY OF CREATININE: CPT | Performed by: INTERNAL MEDICINE

## 2019-01-10 PROCEDURE — 40000257 ZZH STATISTIC CONSULT NO CHARGE VASC ACCESS

## 2019-01-10 PROCEDURE — 25000132 ZZH RX MED GY IP 250 OP 250 PS 637: Performed by: INTERNAL MEDICINE

## 2019-01-10 PROCEDURE — 12000000 ZZH R&B MED SURG/OB

## 2019-01-10 PROCEDURE — 27210219 ZZH KIT SHRLOCK 5FR DBL LUM PWR P

## 2019-01-10 PROCEDURE — 25000125 ZZHC RX 250: Performed by: INTERNAL MEDICINE

## 2019-01-10 RX ORDER — LIDOCAINE 40 MG/G
CREAM TOPICAL
Status: DISCONTINUED | OUTPATIENT
Start: 2019-01-10 | End: 2019-01-11 | Stop reason: HOSPADM

## 2019-01-10 RX ORDER — VANCOMYCIN HYDROCHLORIDE 1 G/20ML
2000 INJECTION, POWDER, LYOPHILIZED, FOR SOLUTION INTRAVENOUS EVERY 12 HOURS
Qty: 600 ML | Refills: 0 | DISCHARGE
Start: 2019-01-10 | End: 2019-01-17

## 2019-01-10 RX ORDER — ACETAMINOPHEN 325 MG/1
650 TABLET ORAL EVERY 4 HOURS PRN
COMMUNITY
Start: 2019-01-10 | End: 2019-02-09

## 2019-01-10 RX ADMIN — HYDROCODONE BITARTRATE AND ACETAMINOPHEN 1 TABLET: 5; 325 TABLET ORAL at 13:31

## 2019-01-10 RX ADMIN — HYDROCODONE BITARTRATE AND ACETAMINOPHEN 1 TABLET: 5; 325 TABLET ORAL at 04:11

## 2019-01-10 RX ADMIN — SODIUM CHLORIDE 2000 MG: 9 INJECTION, SOLUTION INTRAVENOUS at 04:11

## 2019-01-10 RX ADMIN — DOCUSATE SODIUM 100 MG: 100 CAPSULE, LIQUID FILLED ORAL at 09:20

## 2019-01-10 RX ADMIN — LIDOCAINE HYDROCHLORIDE 1.5 ML: 10 INJECTION, SOLUTION EPIDURAL; INFILTRATION; INTRACAUDAL; PERINEURAL at 15:17

## 2019-01-10 RX ADMIN — SODIUM CHLORIDE 2000 MG: 9 INJECTION, SOLUTION INTRAVENOUS at 17:31

## 2019-01-10 RX ADMIN — DOCUSATE SODIUM 100 MG: 100 CAPSULE, LIQUID FILLED ORAL at 20:46

## 2019-01-10 ASSESSMENT — ACTIVITIES OF DAILY LIVING (ADL)
ADLS_ACUITY_SCORE: 12

## 2019-01-10 NOTE — PHARMACY-VANCOMYCIN DOSING SERVICE
Pharmacy Vancomycin Note  Date of Service 2019  Patient's  1955   63 year old, male    Indication: Abscess  Goal Trough Level: 10-15 mg/L  Day of Therapy: 6  Current Vancomycin regimen:  2000 mg IV q12h    Current estimated CrCl = Estimated Creatinine Clearance: 92.9 mL/min (based on SCr of 0.8 mg/dL).    Creatinine for last 3 days  2019:  6:36 AM Creatinine 0.73 mg/dL  2019:  7:08 AM Creatinine 0.84 mg/dL  2019:  7:22 AM Creatinine 0.80 mg/dL    Recent Vancomycin Levels (past 3 days)  2019:  3:10 PM Vancomycin Level 10.7 mg/L  2019:  2:58 PM Vancomycin Level 15.9 mg/L    Vancomycin IV Administrations (past 72 hours)                   vancomycin (VANCOCIN) 2,000 mg in sodium chloride 0.9 % 500 mL intermittent infusion (mg) 2,000 mg New Bag 19 1554     2,000 mg New Bag  0416     2,000 mg New Bag 19 1617     2,000 mg New Bag  0426                Nephrotoxins and other renal medications (From now, onward)    Start     Dose/Rate Route Frequency Ordered Stop    19 0400  vancomycin (VANCOCIN) 2,000 mg in sodium chloride 0.9 % 500 mL intermittent infusion      2,000 mg  over 2 Hours Intravenous EVERY 12 HOURS 19 1905      19 204  ibuprofen (ADVIL/MOTRIN) tablet 600 mg      600 mg Oral 3 TIMES DAILY PRN 19 2040               Contrast Orders - past 72 hours (72h ago, onward)    None          Interpretation of levels and current regimen:  Trough level is  Therapeutic    Has serum creatinine changed > 50% in last 72 hours: No    Urine output:  unable to determine    Renal Function: Stable    Plan:  1.  Continue Current Dose  2.  Pharmacy will check trough levels as appropriate in 1-3 Days.    3. Serum creatinine levels will be ordered daily for the first week of therapy and at least twice weekly for subsequent weeks.      Mónica Montero        .

## 2019-01-10 NOTE — PLAN OF CARE
Pt A&O. CMS intact. VSS. Up independently in room. Taking norco for pain. Voiding adequately per patient. Dressing intact. Continue to monitor.

## 2019-01-10 NOTE — PLAN OF CARE
Pt is A & O x 4, Azeri speaking. Up independently with walker. Dressing was changed by wound nurse. Received norco for pain. CMS intact except for edema in left ankle and foot. Lungs sound clear, bowel sounds active. On vanco for antibiotics. Will continue to monitor.

## 2019-01-10 NOTE — PROGRESS NOTES
"[facility]    Hospitalist Progress Note    Date of Service (when I saw the patient): 01/10/2019    Assessment & Plan   64 y/o male with a h/o \"insulin resistance\" presented to the ED on 1/4 with LLE erythema and pain.     Left ankle abscess with Cellulitis  MRSA bacteriemia  - presented with L ankle and foot pain. Ultrasound in ED with superficial abscess which was drained.  - started initially on Zosyn/Vanco  - WBC initially 15.0->14.8->11.3->7.8 on 1/7  - ID consult appreciated  - BC from 01/04- 2/2 bottles positive for MRSA; repeat BC- NGTD  - fluid culture- also positive for MRSA  - currently on iv Vanco  - pain control- prn norco, prn acetaminophen  - afebrile  - surveillance blood cultures daily, negative thus far  - plans for a total 2 weeks IV vanco if surveillance negative, 4 weeks if positive surveillance  - no insurance, undocumented making payment challenging   - wound care consult  - as per ID- will need 7 more days of iv Vanco  - PICC line ordered  - will arrange for home infusion ([facility])  - CC help appreciated  - follow up with Dr Dotson and PMD    \"Insulin resistance\"  Noted on outpatient records. A1C at 5.9  - BS acceptable 93--115  - discontinued BS checks and SSI    FEN (fluids, electrolytes and nutrition): regular diet  Discussed with nursing.  DVT Prophylaxis: ambulate  Code Status: Full Code    Disposition: plan to discharge later on today or tomorrow pending the timing of PICC line and teaching    Jessica Prado MD    Interval History    Interpretor present; pt is doing ok, no events overnight; pain controlled; denies cp/sob; discussed with CC, RN    -Data reviewed today: I reviewed all new labs and imaging results over the last 24 hours. I personally reviewed no images or EKG's today.    Physical Exam   Temp: 98  F (36.7  C) Temp src: Oral BP: 141/90 Pulse: 76 Heart Rate: 78 Resp: 16 SpO2: 94 % O2 Device: None (Room air)    Vitals:    01/04/19 1531   Weight: 77.1 kg " (170 lb)     Vital Signs with Ranges  Temp:  [96.5  F (35.8  C)-98  F (36.7  C)] 98  F (36.7  C)  Pulse:  [76-92] 76  Heart Rate:  [73-94] 78  Resp:  [16] 16  BP: (123-151)/(73-90) 141/90  SpO2:  [94 %] 94 %  I/O last 3 completed shifts:  In: -   Out: 200 [Urine:200]    Constitutional: Alert, oriented, no acute distress  Respiratory: bilateral air entry, no wheezing, no rales, no crackles  Cardiovascular: RRR. No murmurs  GI: Soft, non-tender, non-disteneded, good bowel sounds  Skin/Integumen: left ankle wrapped; erythema in ankle/ foot improving.  Neuro- AAOX3, no FNDs      Medications       docusate sodium  100 mg Oral BID     sodium chloride (PF)  10 mL Intracatheter Q8H     vancomycin (VANCOCIN) IV  2,000 mg Intravenous Q12H       Data   Recent Labs   Lab 01/10/19  0638 01/09/19  0722 01/08/19  0708 01/07/19  0636 01/06/19  0726 01/05/19  0733 01/04/19  1730   WBC  --   --  7.3 7.8 11.3* 14.8* 15.0*   HGB  --   --   --   --  13.5 13.6 14.9   MCV  --   --   --   --  91 90 90   PLT  --   --   --   --  286 310 309   NA  --   --   --  139 137  --  137   POTASSIUM  --   --   --  4.1 4.0  --  3.8   CHLORIDE  --   --   --  108 106  --  105   CO2  --   --   --  24 25  --  26   BUN  --   --   --  16 12  --  16   CR 0.80 0.80 0.84 0.73 0.76  --  0.87   ANIONGAP  --   --   --  7 6  --  6   OLEGARIO  --   --   --  8.6 8.3*  --  9.2   GLC  --   --   --  111* 98  --  102*       No results found for this or any previous visit (from the past 24 hour(s)).

## 2019-01-10 NOTE — PLAN OF CARE
Pt is A & O x 4, but Sami speaking. Lungs sound clear, bowel sounds active, cms intact except for some numbness on left ankle and edema. Received Norco for pain. Up independently with walker. Getting a PICC line today. Discharge tomorrow. Will continue to monitor.

## 2019-01-10 NOTE — PROGRESS NOTES
"[facility]  Infectious Disease Progress Note          Assessment and Plan:   IMPRESSION:   1.  A 63-year-old male with acute spontaneous left ankle medial abscess, on drainage it looks like community-acquired MRSA, not clear if it has been completely drained, no evidence of deeper involvement or ankle involvement.   2.  No major sepsis, but admission blood cultures 2/2 rapidly positive for the same MRSA organism, likely spillover from the leg, but of course as always, Staph aureus bacteremia major issue, no signs of secondary involved sites or active sepsis.      RECOMMENDATIONS:   1.  Vancomycin, day 7 today   2.  Serial blood cultures clear  3.  Will need some IV antibiotics due to bacteremia.  Social Service to see, hold on any long line until cultures are documented clear, wait 1 day more, major disposition issue, no ins and / not citizen , await more investigation options.   4.  Continue to follow the foot.  Not clear to me that it has been adequately drained by the bedside I and D in the ER; however, as it turns out here is going to need extended IV antibiotics, so likely Ok and now looks better  5 Plan is PICC and possibly only 2 weeks IV , very likely spillover from ankle wo evidence hidden/deeper/valve infection, if more + both further LAUREANO and 4 weeks IV, all of this now problematic, looks like attempt at 2 weeks Ok but major coverage issue  6 Orders in for 1 more week vanco, if we find a way to do it will need PICC to go              Interval History:   no new complaints and doing well; no cp, sob, n/v/d, or abd pain. cx Follow-up neg, no fever no new pain site , no ins              Medications:       docusate sodium  100 mg Oral BID     vancomycin (VANCOCIN) IV  2,000 mg Intravenous Q12H                  Physical Exam:   Blood pressure 141/90, pulse 76, temperature 98  F (36.7  C), temperature source Oral, resp. rate 16, height 1.651 m (5' 5\"), weight 77.1 kg (170 lb), SpO2 94 " %.  Wt Readings from Last 2 Encounters:   01/04/19 77.1 kg (170 lb)     Vital Signs with Ranges  Temp:  [96.5  F (35.8  C)-98  F (36.7  C)] 98  F (36.7  C)  Pulse:  [76-92] 76  Heart Rate:  [73-94] 78  Resp:  [16] 16  BP: (123-151)/(73-90) 141/90  SpO2:  [94 %] 94 %    Constitutional: Awake, alert, cooperative, no apparent distress   Lungs: Clear to auscultation bilaterally, no crackles or wheezing   Cardiovascular: Regular rate and rhythm, normal S1 and S2, and no murmur noted   Abdomen: Normal bowel sounds, soft, non-distended, non-tender   Skin: No rashes, no cyanosis, no edema foot same   Other:           Data:   All microbiology laboratory data reviewed.  Recent Labs   Lab Test 01/08/19  0708 01/07/19  0636 01/06/19  0726 01/05/19  0733 01/04/19  1730   WBC 7.3 7.8 11.3* 14.8* 15.0*   HGB  --   --  13.5 13.6 14.9   HCT  --   --  38.1* 39.5* 41.8   MCV  --   --  91 90 90   PLT  --   --  286 310 309     Recent Labs   Lab Test 01/10/19  0638 01/09/19  0722 01/08/19  0708   CR 0.80 0.80 0.84     No lab results found.  Recent Labs   Lab Test 01/09/19  0721 01/08/19  0707 01/07/19  0635 01/06/19  1125 01/06/19  1121 01/04/19  1816 01/04/19  1730 01/04/19  1723   CULT No growth after 20 hours No growth after 2 days No growth after 3 days No growth after 4 days No growth after 4 days Cultured on the 1st day of incubation:  Methicillin resistant Staphylococcus aureus (MRSA)  This isolate DOES NOT demonstrate inducible clindamycin resistance in vitro. Clindamycin   is susceptible and could be used when indicated, however, erythromycin is resistant and   should not be used.  *  Critical Value/Significant Value, preliminary result only, called to and read back by  Cornelia Ho RN on SH55 at 0916 on 1/5/19 ac.    (Note)  POSITIVE for STAPHYLOCOCCUS AUREUS and POSITIVE for the mecA gene  (MRSA) by Usabillaigene mulitplex nucleic acid test. Final identification  and antimicrobial susceptibility testing will be verified by  standard  methods.      Specimen tested with DocDepigene multiplex, gram-positive blood culture  nucleic acid test for the following targets: Staph aureus, Staph  epidermidis, Staph lugdunensis, other Staph species, Enterococcus  faecalis, Enterococcus faecium, Streptococcus species, S. agalactiae,  S. anginosus grp., S. pneumoniae, S. pyogenes, Listeria sp., mecA  (methicillin resistance) and Leonarda/B (vancomycin resistance).      Critical Value/Significant Value called to and read back by FAUSTINO SHEIKH 01.05.2019 AT 12.07 PM,ZG   Cultured on the 1st day of incubation:  Methicillin resistant Staphylococcus aureus (MRSA)  *  Critical Value/Significant Value, preliminary result only, called to and read back by  Cornelia Sheikh RN on SH55 at 1143 on 1/5/19 ac.    Susceptibility testing done on previous specimen Heavy growth  Methicillin resistant Staphylococcus aureus (MRSA)  This isolate DOES NOT demonstrate inducible clindamycin resistance in vitro. Clindamycin   is susceptible and could be used when indicated, however, erythromycin is resistant and   should not be used.  *

## 2019-01-10 NOTE — PROGRESS NOTES
"Care Coordination:    Continuing to follow.    Pt chose to use [facility] for IV antibiotics. This AM, I provided a heads-up to lyndsay Elder.     I faxed facesheet, H&P, and antibiotic orders to [facility].    Left VM with Jael to see if they can do a teach at the hospital today,   And if the medication and supplies can be prepped and deliver for tomorrow.    If they cannot, pt may need to stay here for the AM antibiotic dose tomorrow.    ADDENDUM:     [facility] can have a nurse to pt's house at 7:00PM tomorrow Friday 1/11/19.  Thus, he will need to stay in the hospital for tomorrow's AM dose.  I paged Hospitalist with this update.     [facility] nurse lyndsay Elder will come do a pre-teaching to patient at 5:30pm today 1/10/19, to increase learning and reduce need for additional out of pocket nursing services. She will also help them register their payment method via her laptop on their website, final estimate is $505.68 for 7 days of drug and supplies, $180 for two 2-hr nurse visits.     Because pt needs a primary care provider established for routine follow-up, I scheduled pt at a low-cost / sliding scale clinic (entered on AVS):   Dr. Denisa Melvin, on Friday January 11 at 1:10 PM for hospital follow-up with .    [phone] ([facility], [address] [address])     And added office information for patient on the follow-up with infectious disease instructions one AVS:  Follow up with Infectious disease, Dr. Dotson [phone]  ([facility], [address], [address], [place] [address])     I ensured the wound care instructions were entered under the wound care order in simple language:     ONCE DAILY to Left Ankle:   1.  Cleanse with MicroKlenz.   2.  Use sterile Q-tip to pack 1/4\" Iodoform gauze into wound pocket.  Leave 1 inch out for easy removal.     3.  Cover with gauze and tape.       Bedside nurse will review discharge paperwork today, and again tomorrow prior to " discharge, to ensure teaching.      Leona Thorpe RN, BSN  [facility] Care Coordinator   Mobile Phone: [phone]

## 2019-01-10 NOTE — DISCHARGE INSTRUCTIONS
"Repeated instructions:   Wound care to left ankle: DAILY and as needed:   1.  Cleanse with MicroKlenz.   2.  Gently probe wound with qtip to identify depth/direction of pocketing.  Then pack 1/4\" Iodoform gauze into the pocket.  Leave an inch or so hanging out for easy removal.     3.  Cover with Mepilex or gauze and tape.  Label with time/date/initials.  (you will be sent with some supplies; you can get more at [facility], [facility], etc)    IV antibiotics and nurse:     From [facility]     Call [phone] with questions about your IV or antibiotics.     You can also call your Primary Care Doctor (Dr. Denisa Dillon) for help, [phone]    If you wound is getting worse, go to the Emergency Room (ER).  "

## 2019-01-11 VITALS
BODY MASS INDEX: 28.32 KG/M2 | SYSTOLIC BLOOD PRESSURE: 133 MMHG | RESPIRATION RATE: 16 BRPM | WEIGHT: 170 LBS | HEART RATE: 74 BPM | TEMPERATURE: 98 F | HEIGHT: 65 IN | DIASTOLIC BLOOD PRESSURE: 81 MMHG | OXYGEN SATURATION: 95 %

## 2019-01-11 PROCEDURE — 40000239 ZZH STATISTIC VAT ROUNDS

## 2019-01-11 PROCEDURE — G0463 HOSPITAL OUTPT CLINIC VISIT: HCPCS

## 2019-01-11 PROCEDURE — 99238 HOSP IP/OBS DSCHRG MGMT 30/<: CPT | Performed by: INTERNAL MEDICINE

## 2019-01-11 PROCEDURE — 25000132 ZZH RX MED GY IP 250 OP 250 PS 637: Performed by: INTERNAL MEDICINE

## 2019-01-11 PROCEDURE — 25000128 H RX IP 250 OP 636: Performed by: INTERNAL MEDICINE

## 2019-01-11 RX ADMIN — ACETAMINOPHEN 650 MG: 325 TABLET, FILM COATED ORAL at 08:12

## 2019-01-11 RX ADMIN — SODIUM CHLORIDE 2000 MG: 9 INJECTION, SOLUTION INTRAVENOUS at 06:03

## 2019-01-11 RX ADMIN — DOCUSATE SODIUM 100 MG: 100 CAPSULE, LIQUID FILLED ORAL at 08:13

## 2019-01-11 ASSESSMENT — ACTIVITIES OF DAILY LIVING (ADL)
ADLS_ACUITY_SCORE: 12

## 2019-01-11 NOTE — PLAN OF CARE
Pt was A & O x 4, Faroese speaking. Up independently. Lungs sound clear, bowel sounds active, cms intact except for edema in left leg. Dressing was changed. Received tylenol for pain. Discharged instruction was reviewed with pt and spouse via . Was discharged home.

## 2019-01-11 NOTE — DISCHARGE SUMMARY
"[facility]    Discharge Summary  Hospitalist    Date of Admission:  1/4/2019  Date of Discharge:  1/11/2019  Discharging Provider: Jessica Prado MD  Date of Service (when I saw the patient): 01/11/19    Discharge Diagnoses   Left ankle abscess with left LE cellulitis  MRSA bacteriemia     History of Present Illness   62 y/o male with a h/o \"insulin resistance\" presented to the ED on 1/4 with LLE erythema and pain; for a detailed HPI- please refer to H&P done by Dr. Zafar Pizano on 01/04/2019.      Hospital Course   Swapnil Newberry was admitted on 1/4/2019.  The following problems were addressed during his hospitalization:    Left ankle abscess with Cellulitis  MRSA bacteriemia  - presented with L ankle and foot pain. Ultrasound in ED with superficial abscess which was drained.  - started initially on Zosyn/Vanco  - WBC initially 15.0; LA 1.7  - BC from 01/04- 2/2 bottles positive for MRSA; repeat BC- NGTD  - fluid culture- also positive for MRSA  - ID consulted- he was continued on iv Vanco while in the hospital  - pain control- prn norco, prn acetaminophen  - improved, area of swelling and erythema improved, remained afebrile; WBcs improved to ->14.8->11.3->7.8 on 1/7  - surveillance blood cultures daily, negative thus far  - ID recommended a total 2 weeks IV vanco if surveillance negative,  - received iv Vanco for 1 week in the hospital- will need 7 more days of iv Vanco  - PICC line placed and arranged for home infusion ([facility])  - CC help appreciated  - follow up with Dr Dotson and PMD     \"Insulin resistance\"  Noted on outpatient records. A1C at 5.9  - BS acceptable 93--115  - discontinued BS checks and SSI      Jessica Prado MD    Significant Results and Procedures   See below    Pending Results   These results will be followed up by ID  Unresulted Labs Ordered in the Past 30 Days of this Admission     Date and Time Order Name Status Description    1/9/2019 0000 " Blood culture Preliminary     1/8/2019 0000 Blood culture Preliminary     1/7/2019 0000 Blood culture Preliminary     1/6/2019 1011 Blood culture Preliminary     1/6/2019 1011 Blood culture Preliminary           Code Status   Full Code       Primary Care Physician   Denisa Dillon    Physical Exam   Temp: 98  F (36.7  C) Temp src: Oral BP: 133/81 Pulse: 74 Heart Rate: 79 Resp: 16 SpO2: 95 % O2 Device: None (Room air)    Vitals:    01/04/19 1531   Weight: 77.1 kg (170 lb)     Vital Signs with Ranges  Temp:  [97.2  F (36.2  C)-98  F (36.7  C)] 98  F (36.7  C)  Pulse:  [72-80] 74  Heart Rate:  [72-82] 79  Resp:  [16] 16  BP: (132-138)/(81-86) 133/81  SpO2:  [94 %-97 %] 95 %  I/O last 3 completed shifts:  In: -   Out: 200 [Urine:200]    Constitutional:   Alert, oriented, no acute distress  Respiratory: bilateral air entry, no wheezing, no rales, no crackles  Cardiovascular: RRR. No murmurs  GI: Soft, non-tender, non-disteneded, good bowel sounds  Skin/Integumen: left medial ankle- small round wound, no discharge, mild surrounding erythema- much improved; adjacent area with scab   Neuro- AAOX3, no FNDs       Discharge Disposition   Discharged to home  Condition at discharge: Good    Consultations This Hospital Stay   PHARMACY TO DOSE VANCO  INFECTIOUS DISEASES IP CONSULT  SOCIAL WORK IP CONSULT  CARE COORDINATOR IP CONSULT  PHARMACY LIAISON FOR MEDICATION COVERAGE CONSULT  WOUND OSTOMY CONTINENCE NURSE  IP CONSULT  CARE TRANSITION RN/SW IP CONSULT  VASCULAR ACCESS ADULT IP CONSULT  VASCULAR ACCESS ADULT IP CONSULT    Time Spent on this Encounter   IJessica, personally saw the patient today and spent less than or equal to 30 minutes discharging this patient.    Discharge Orders      Home infusion referral      Reason for your hospital stay    Left ankle infection     Follow-up and recommended labs and tests     Follow up with primary care provider, Dr. Denisa Melvin, on Friday January 11 at 1:10 PM for hospitals  "follow- up.  No follow up labs or test are needed. [phone] ([facility], [address], [facility] [place] [address])    Follow up with Infectious disease, Dr. Dotson [phone]  ([facility], [address], [address], [place] [address])     Activity    Your activity upon discharge: activity as tolerated     When to contact your care team    Call your primary doctor if you have any of the following: temperature greater than 100.5 or less than 96, increased swelling, redness, drainage of the foot, chills, dizziness, abdominal pain, severe diarrhea, rashes,     IV access    **Ordering Provider MUST call/page Care Coordinator/ to discuss arranging this service**    You are going home with the following vascular access device: PICC.     Wound care and dressings    Instructions to care for your wound at home: as directed by wound care  RN.    ONCE DAILY to Left Ankle:   1.  Cleanse with MicroKlenz.   2.  Use sterile Q-tip to pack 1/4\" Iodoform gauze into wound pocket.  Leave 1 inch out for easy removal.     3.  Cover with gauze and tape.       Full Code     Diet    Follow this diet upon discharge: Orders Placed This Encounter      Regular Diet Adult     Discharge Medications   Current Discharge Medication List      START taking these medications    Details   acetaminophen (TYLENOL) 325 MG tablet Take 2 tablets (650 mg) by mouth every 4 hours as needed for mild pain    Associated Diagnoses: Cellulitis and abscess of left leg      Vancomycin HCl (VANCOCIN) 100 mg/mL injection Inject 2 g (2,000 mg) into the vein every 12 hours for 7 days creatinine, vancomycin trough, twice weekly while on this medication to be faxed to Dr. Dotson office at [phone].  Qty: 600 mL, Refills: 0    Associated Diagnoses: Cellulitis and abscess of left leg         CONTINUE these medications which have NOT CHANGED    Details   ibuprofen (ADVIL/MOTRIN) 200 MG tablet Take 200 mg by mouth every 6 hours as " needed for mild pain           Allergies   No Known Allergies  Data   Most Recent 3 CBC's:  Recent Labs   Lab Test 01/08/19  0708 01/07/19  0636 01/06/19  0726 01/05/19  0733 01/04/19  1730   WBC 7.3 7.8 11.3* 14.8* 15.0*   HGB  --   --  13.5 13.6 14.9   MCV  --   --  91 90 90   PLT  --   --  286 310 309      Most Recent 3 BMP's:  Recent Labs   Lab Test 01/10/19  0638 01/09/19  0722 01/08/19  0708 01/07/19  0636 01/06/19  0726 01/04/19  1730   NA  --   --   --  139 137 137   POTASSIUM  --   --   --  4.1 4.0 3.8   CHLORIDE  --   --   --  108 106 105   CO2  --   --   --  24 25 26   BUN  --   --   --  16 12 16   CR 0.80 0.80 0.84 0.73 0.76 0.87   ANIONGAP  --   --   --  7 6 6   OLEGARIO  --   --   --  8.6 8.3* 9.2   GLC  --   --   --  111* 98 102*     Most Recent 2 LFT's:No lab results found.  Most Recent INR's and Anticoagulation Dosing History:  Anticoagulation Dose History     There is no flowsheet data to display.        Most Recent 3 Troponin's:No lab results found.  Most Recent Cholesterol Panel:No lab results found.  Most Recent 6 Bacteria Isolates From Any Culture (See EPIC Reports for Culture Details):  Recent Labs   Lab Test 01/09/19  0721 01/08/19  0707 01/07/19  0635 01/06/19  1125 01/06/19  1121 01/04/19  1816   CULT No growth after 2 days No growth after 3 days No growth after 4 days No growth after 5 days No growth after 5 days Cultured on the 1st day of incubation:  Methicillin resistant Staphylococcus aureus (MRSA)  This isolate DOES NOT demonstrate inducible clindamycin resistance in vitro. Clindamycin   is susceptible and could be used when indicated, however, erythromycin is resistant and   should not be used.  *  Critical Value/Significant Value, preliminary result only, called to and read back by  Cornelia Ho RN on SH55 at 0916 on 1/5/19 ac.    (Note)  POSITIVE for STAPHYLOCOCCUS AUREUS and POSITIVE for the mecA gene  (MRSA) by ulikeigene mulitplex nucleic acid test. Final identification  and  antimicrobial susceptibility testing will be verified by standard  methods.      Specimen tested with Verigene multiplex, gram-positive blood culture  nucleic acid test for the following targets: Staph aureus, Staph  epidermidis, Staph lugdunensis, other Staph species, Enterococcus  faecalis, Enterococcus faecium, Streptococcus species, S. agalactiae,  S. anginosus grp., S. pneumoniae, S. pyogenes, Listeria sp., mecA  (methicillin resistance) and Leonarda/B (vancomycin resistance).      Critical Value/Significant Value called to and read back by FAUSTINO SHEIKH 01.05.2019 AT 12.07 PM,ZG       Most Recent TSH, T4 and A1c Labs:  Recent Labs   Lab Test 01/04/19  1730   A1C 5.9*     Results for orders placed or performed during the hospital encounter of 01/04/19   POC US SOFT TISSUE    Impression    ED Bedside Limited Ultrasound  Body area scanned: L medial ankle  Performed by: Travis Buckner MD  Indication: pain/redness/swelling, eval for abscess  Findings/Interpretation: fluid collection identified primarily overlying L medial malleolus, no deeper extension evident on US, no nearby pulsatile vessels  Consistent with soft tissue abscess   Key images were digitally archived in the [facility] radiology system.

## 2019-01-11 NOTE — PLAN OF CARE
Progressing per POC.  VSS, RA.  CMS intact.  PICC line in place, infusion nurse came and educated pt and family on Abx infusion.  Writer educated pt and wife on wound care and dressing change.  Up independent.  Please discharge pt early in the morning, pt has appointment with PCP at 1pm, instruction given to pt.  Continue to monitor.

## 2019-01-11 NOTE — PLAN OF CARE
Patient is A&O, VSS on RA, CMS intact, regular diet, up independently, and dressing CDI. Norco available for pain control, on isolation, and PICC line patent. The plan is for patient to discharge before 11am today due to medical appointment at 1pm. Will continue to monitor

## 2019-01-11 NOTE — PROGRESS NOTES
"[facility] Nurse Inpatient Wound Assessment     Follow-up Assessment of wound(s) on pt's:   Left medial ankle        Data:   Patient History:      per MD note(s): 62 y/o male with a h/o \"insulin resistance\" presented to the ED on 1/4 with LLE erythema and pain.        José Luis Assessment and sub scores:     Sensory Perception: 3-->slightly limited    Moisture: 4-->rarely moist   Activity: 3-->walks occasionally     Mobility: 3-->slightly limited   Nutrition: 3-->adequate   José Luis Score: 19         Current Diet / Nutrition:     Orders Placed This Encounter      Regular Diet Adult      Diet      Labs:   Recent Labs   Lab Test 01/08/19  0708  01/06/19  0726  01/04/19  1730   HGB  --   --  13.5   < > 14.9   WBC 7.3   < > 11.3*   < > 15.0*   A1C  --   --   --   --  5.9*    < > = values in this interval not displayed.     Wound Assessment (location):   Left medial ankle  Wound History:  Pt states he noticed a blister and redness about 9 days ago.  He is not aware of any precipitating factors, ie trauma, bug bite, etc.  Wound was lanced in ED.      Wound Base: full base not visible.  Appears pink/red where visible.      Specific Dimensions (length x width x depth, in cm) :   Approx 0.5 x 0.5 x 0.5+cm    Tunneling:  N/A    Undermining: up to 1+cm, with very slight pocketing all around, improving     Palpation of the wound bed: not fluctuant, but feels swollen; pocket seems getting smaller.  Wound base feels like a fairly firm end-point but no bone exposed    Slough appearance:  none    Eschar appearance:  none    Periwound Skin: edema and erythema, proximal to wound is an approx 1.5 x 1.5 x 0cm dark red scabby area, superficial, improving; peeling old epidermis    Color: mildly reddened    Temperature  normal     Drainage:  Small bloody, less purulent    Odor: none    Pain:  minimal and moderate with probing/packing    [facility] photo of left ankle 1-9-19:                Intervention:     Patient's chart " "evaluated.      Wound(s) assessed    Wound Care:  Cleansed and dressed with small amount Iodoform packing and Mepilex    Orders  reviewed    Supplies gathered     Discussed plan of care with pt, wife, nurse             All patient / family questions answered:  YES;  was present          Assessment:          Left ankle wound of unclear initial etiology.  Lanced in ED 5 days ago, and small pocket of purulent drainage is improving with less drainage today, and appears less purulent.   Pt and wife present for education of wound cares today and seem to understand everything well and are willing to do the cares at home.   Pt has wound care supplies for at least the next couple of weeks.          Plan:     Nursing to notify the Provider(s) and re-consult the [facility] Nurse if wound(s) deteriorate(s) or if the wound care plan needs reevaluation.      Wound care to left ankle: Daily and prn:  1.  Cleanse with MicroKlenz.   2.  Gently probe wound with qtip to identify depth/direction of pocketing.  Then pack 1/4\" Iodoform gauze into the pocket.  Leave an inch or so hanging out for easy removal.    3.  Cover with Mepilex or gauze and tape.  Label with time/date/initials.     WO Nurse will return: weekly/prn; pt discharging this afternoon       "

## 2019-01-11 NOTE — PROGRESS NOTES
Paged MD to request discharge order and updated signature order, at 0808.   No orders noted, re-paged at 0905.  RN needs time to review papers with pt.

## 2019-01-12 LAB
BACTERIA SPEC CULT: NO GROWTH
BACTERIA SPEC CULT: NO GROWTH
Lab: NORMAL
Lab: NORMAL
SPECIMEN SOURCE: NORMAL
SPECIMEN SOURCE: NORMAL

## 2019-01-13 LAB
BACTERIA SPEC CULT: NO GROWTH
Lab: NORMAL
SPECIMEN SOURCE: NORMAL

## 2019-01-14 LAB
BACTERIA SPEC CULT: NO GROWTH
Lab: NORMAL
SPECIMEN SOURCE: NORMAL

## 2019-01-15 LAB
BACTERIA SPEC CULT: NO GROWTH
Lab: NORMAL
SPECIMEN SOURCE: NORMAL

## 2021-03-11 ENCOUNTER — APPOINTMENT (OUTPATIENT)
Dept: INTERPRETER SERVICES | Facility: CLINIC | Age: 66
End: 2021-03-11